# Patient Record
Sex: MALE | Race: BLACK OR AFRICAN AMERICAN
[De-identification: names, ages, dates, MRNs, and addresses within clinical notes are randomized per-mention and may not be internally consistent; named-entity substitution may affect disease eponyms.]

---

## 2018-11-16 ENCOUNTER — HOSPITAL ENCOUNTER (INPATIENT)
Dept: HOSPITAL 72 - EMR | Age: 78
LOS: 5 days | Discharge: NURSING, REHAB OR LONG-TERM CARE | DRG: 91 | End: 2018-11-21
Payer: MEDICARE

## 2018-11-16 VITALS — DIASTOLIC BLOOD PRESSURE: 91 MMHG | SYSTOLIC BLOOD PRESSURE: 112 MMHG

## 2018-11-16 VITALS — SYSTOLIC BLOOD PRESSURE: 106 MMHG | DIASTOLIC BLOOD PRESSURE: 85 MMHG

## 2018-11-16 VITALS — DIASTOLIC BLOOD PRESSURE: 67 MMHG | SYSTOLIC BLOOD PRESSURE: 96 MMHG

## 2018-11-16 VITALS — SYSTOLIC BLOOD PRESSURE: 113 MMHG | DIASTOLIC BLOOD PRESSURE: 54 MMHG

## 2018-11-16 VITALS — BODY MASS INDEX: 24.96 KG/M2 | HEIGHT: 67 IN | WEIGHT: 159.05 LBS

## 2018-11-16 DIAGNOSIS — Z66: ICD-10-CM

## 2018-11-16 DIAGNOSIS — I27.20: ICD-10-CM

## 2018-11-16 DIAGNOSIS — N17.9: ICD-10-CM

## 2018-11-16 DIAGNOSIS — I69.391: ICD-10-CM

## 2018-11-16 DIAGNOSIS — G20: ICD-10-CM

## 2018-11-16 DIAGNOSIS — G92: Primary | ICD-10-CM

## 2018-11-16 DIAGNOSIS — I36.1: ICD-10-CM

## 2018-11-16 DIAGNOSIS — L89.154: ICD-10-CM

## 2018-11-16 DIAGNOSIS — F41.9: ICD-10-CM

## 2018-11-16 DIAGNOSIS — R62.7: ICD-10-CM

## 2018-11-16 DIAGNOSIS — Z51.5: ICD-10-CM

## 2018-11-16 DIAGNOSIS — D69.6: ICD-10-CM

## 2018-11-16 DIAGNOSIS — R33.9: ICD-10-CM

## 2018-11-16 DIAGNOSIS — E43: ICD-10-CM

## 2018-11-16 DIAGNOSIS — E86.0: ICD-10-CM

## 2018-11-16 DIAGNOSIS — E87.0: ICD-10-CM

## 2018-11-16 DIAGNOSIS — I34.0: ICD-10-CM

## 2018-11-16 DIAGNOSIS — D63.8: ICD-10-CM

## 2018-11-16 DIAGNOSIS — R13.10: ICD-10-CM

## 2018-11-16 DIAGNOSIS — I95.9: ICD-10-CM

## 2018-11-16 DIAGNOSIS — G82.50: ICD-10-CM

## 2018-11-16 LAB
ADD MANUAL DIFF: YES
ALBUMIN SERPL-MCNC: 3 G/DL (ref 3.4–5)
ALBUMIN/GLOB SERPL: 0.7 {RATIO} (ref 1–2.7)
ALP SERPL-CCNC: 45 U/L (ref 46–116)
ALT SERPL-CCNC: 78 U/L (ref 12–78)
ANION GAP SERPL CALC-SCNC: 12 MMOL/L (ref 5–15)
APTT BLD: 30 SEC (ref 23–33)
AST SERPL-CCNC: 55 U/L (ref 15–37)
BILIRUB SERPL-MCNC: 0.2 MG/DL (ref 0.2–1)
BUN SERPL-MCNC: 44 MG/DL (ref 7–18)
CALCIUM SERPL-MCNC: 9.3 MG/DL (ref 8.5–10.1)
CHLORIDE SERPL-SCNC: 114 MMOL/L (ref 98–107)
CK MB SERPL-MCNC: 11.4 NG/ML (ref 0–3.6)
CK SERPL-CCNC: 95 U/L (ref 26–308)
CO2 SERPL-SCNC: 28 MMOL/L (ref 21–32)
CREAT SERPL-MCNC: 1.4 MG/DL (ref 0.55–1.3)
ERYTHROCYTE [DISTWIDTH] IN BLOOD BY AUTOMATED COUNT: 12.4 % (ref 11.6–14.8)
GLOBULIN SER-MCNC: 4.2 G/DL
HCT VFR BLD CALC: 38.1 % (ref 42–52)
HGB BLD-MCNC: 13.1 G/DL (ref 14.2–18)
INR PPP: 1.1 (ref 0.9–1.1)
MCV RBC AUTO: 84 FL (ref 80–99)
PHOSPHATE SERPL-MCNC: 4 MG/DL (ref 2.5–4.9)
PLATELET # BLD: 102 K/UL (ref 150–450)
POTASSIUM SERPL-SCNC: 3.5 MMOL/L (ref 3.5–5.1)
RBC # BLD AUTO: 4.53 M/UL (ref 4.7–6.1)
SODIUM SERPL-SCNC: 153 MMOL/L (ref 136–145)
WBC # BLD AUTO: 7.6 K/UL (ref 4.8–10.8)

## 2018-11-16 PROCEDURE — 83615 LACTATE (LD) (LDH) ENZYME: CPT

## 2018-11-16 PROCEDURE — 87086 URINE CULTURE/COLONY COUNT: CPT

## 2018-11-16 PROCEDURE — 80061 LIPID PANEL: CPT

## 2018-11-16 PROCEDURE — 93306 TTE W/DOPPLER COMPLETE: CPT

## 2018-11-16 PROCEDURE — 83540 ASSAY OF IRON: CPT

## 2018-11-16 PROCEDURE — 82553 CREATINE MB FRACTION: CPT

## 2018-11-16 PROCEDURE — 80053 COMPREHEN METABOLIC PANEL: CPT

## 2018-11-16 PROCEDURE — 70551 MRI BRAIN STEM W/O DYE: CPT

## 2018-11-16 PROCEDURE — 86709 HEPATITIS A IGM ANTIBODY: CPT

## 2018-11-16 PROCEDURE — 85730 THROMBOPLASTIN TIME PARTIAL: CPT

## 2018-11-16 PROCEDURE — 87340 HEPATITIS B SURFACE AG IA: CPT

## 2018-11-16 PROCEDURE — 74018 RADEX ABDOMEN 1 VIEW: CPT

## 2018-11-16 PROCEDURE — 86803 HEPATITIS C AB TEST: CPT

## 2018-11-16 PROCEDURE — 82746 ASSAY OF FOLIC ACID SERUM: CPT

## 2018-11-16 PROCEDURE — 76700 US EXAM ABDOM COMPLETE: CPT

## 2018-11-16 PROCEDURE — 70450 CT HEAD/BRAIN W/O DYE: CPT

## 2018-11-16 PROCEDURE — 84443 ASSAY THYROID STIM HORMONE: CPT

## 2018-11-16 PROCEDURE — 86705 HEP B CORE ANTIBODY IGM: CPT

## 2018-11-16 PROCEDURE — 85025 COMPLETE CBC W/AUTO DIFF WBC: CPT

## 2018-11-16 PROCEDURE — 86140 C-REACTIVE PROTEIN: CPT

## 2018-11-16 PROCEDURE — 82728 ASSAY OF FERRITIN: CPT

## 2018-11-16 PROCEDURE — 84484 ASSAY OF TROPONIN QUANT: CPT

## 2018-11-16 PROCEDURE — 71045 X-RAY EXAM CHEST 1 VIEW: CPT

## 2018-11-16 PROCEDURE — 84100 ASSAY OF PHOSPHORUS: CPT

## 2018-11-16 PROCEDURE — 82607 VITAMIN B-12: CPT

## 2018-11-16 PROCEDURE — 96360 HYDRATION IV INFUSION INIT: CPT

## 2018-11-16 PROCEDURE — 81001 URINALYSIS AUTO W/SCOPE: CPT

## 2018-11-16 PROCEDURE — 84550 ASSAY OF BLOOD/URIC ACID: CPT

## 2018-11-16 PROCEDURE — 82550 ASSAY OF CK (CPK): CPT

## 2018-11-16 PROCEDURE — 93005 ELECTROCARDIOGRAM TRACING: CPT

## 2018-11-16 PROCEDURE — 87040 BLOOD CULTURE FOR BACTERIA: CPT

## 2018-11-16 PROCEDURE — 85610 PROTHROMBIN TIME: CPT

## 2018-11-16 PROCEDURE — 86703 HIV-1/HIV-2 1 RESULT ANTBDY: CPT

## 2018-11-16 PROCEDURE — 85007 BL SMEAR W/DIFF WBC COUNT: CPT

## 2018-11-16 PROCEDURE — 36415 COLL VENOUS BLD VENIPUNCTURE: CPT

## 2018-11-16 PROCEDURE — 82977 ASSAY OF GGT: CPT

## 2018-11-16 PROCEDURE — 83880 ASSAY OF NATRIURETIC PEPTIDE: CPT

## 2018-11-16 PROCEDURE — 83735 ASSAY OF MAGNESIUM: CPT

## 2018-11-16 PROCEDURE — 80048 BASIC METABOLIC PNL TOTAL CA: CPT

## 2018-11-16 PROCEDURE — 83010 ASSAY OF HAPTOGLOBIN QUANT: CPT

## 2018-11-16 PROCEDURE — 99285 EMERGENCY DEPT VISIT HI MDM: CPT

## 2018-11-16 PROCEDURE — 93970 EXTREMITY STUDY: CPT

## 2018-11-16 PROCEDURE — 83036 HEMOGLOBIN GLYCOSYLATED A1C: CPT

## 2018-11-16 PROCEDURE — 82962 GLUCOSE BLOOD TEST: CPT

## 2018-11-16 PROCEDURE — 96361 HYDRATE IV INFUSION ADD-ON: CPT

## 2018-11-16 PROCEDURE — 83605 ASSAY OF LACTIC ACID: CPT

## 2018-11-16 PROCEDURE — 83550 IRON BINDING TEST: CPT

## 2018-11-16 NOTE — DIAGNOSTIC IMAGING REPORT
Indications: Altered mental status

 

Technique: Spiral acquisitions obtained through the brain. Angled axial and coronal 5

x 5 mm slices were reconstructed. Total dose length product 1491.99 mGycm.  CTDI

vol(s) 70.38 mGy. Dose reduction achieved using automated exposure control

 

Comparison: None.

 

Findings: There is marked age-related enlargement of the ventricles and extra axial

CSF spaces, and considerable periventricular deep white matter low-attenuation

consistent with chronic ischemic change. There is an age-indeterminate lacunar

infarct in the left external capsule region. No acute intracranial hemorrhage nor

edema, mass effect, nor midline shift. Otherwise normal gray-white differentiation.

Visualized orbits and sinuses are unremarkable. The calvarium is intact. There is an

ununited multipartite fracture deformity of the left zygomatic arch.

 

Impression: Chronic and age-related changes.

 

Age indeterminate left external capsule region lacunar infarct, suspect old. MRI may

be useful to clarify if clinically indicated

 

Negative for acute intracranial bleed or mass effect

 

Multipartite fracture deformity of the left zygomatic arch. Age indeterminate

although suspect old. Correlate with clinical history

 

 

 

The CT scanner at [facility] is accredited by the [employer] College of

[employer]iology and the scans are performed using protocols designed to limit radiation

exposure to as low as reasonably achievable to attain images of sufficient resolution

adequate for diagnostic evaluation.

## 2018-11-16 NOTE — DIAGNOSTIC IMAGING REPORT
Indication: Cough

 

Technique: One view of the chest

 

Comparison: none

 

Findings: Lungs are somewhat hyperinflated. Lungs and pleural spaces are clear. There

are old healed left rib fractures. The aorta is somewhat tortuous.

 

Impression: No acute process. Findings as noted

## 2018-11-16 NOTE — EMERGENCY ROOM REPORT
History of Present Illness


General


Chief Complaint:  Altered Level of Consciousness


Source:  Family Member





Present Illness


Allergies:  


Coded Allergies:  


     No Known Allergies (Unverified , 11/16/18)





Physical Exam





Vital Signs








  Date Time  Temp Pulse Resp B/P (MAP) Pulse Ox O2 Delivery O2 Flow Rate FiO2


 


11/16/18 11:34  66 12   Room Air  


 


11/16/18 13:14    106/85 100   











Medical Decision Making


Diagnostic Impression:  


 Primary Impression:  


 Altered mental status


 Additional Impressions:  


 CVA (cerebral vascular accident)


 Hypernatremia


 Dehydration


 Dysphagia





Laboratory Tests








Test


  11/16/18


12:20 11/16/18


12:23 11/16/18


14:30


 


White Blood Count


  7.6 K/UL


(4.8-10.8) 


  


 


 


Red Blood Count


  4.53 M/UL


(4.70-6.10)  L 


  


 


 


Hemoglobin


  13.1 G/DL


(14.2-18.0)  L 


  


 


 


Hematocrit


  38.1 %


(42.0-52.0)  L 


  


 


 


Mean Corpuscular Volume 84 FL (80-99)    


 


Mean Corpuscular Hemoglobin


  28.9 PG


(27.0-31.0) 


  


 


 


Mean Corpuscular Hemoglobin


Concent 34.4 G/DL


(32.0-36.0) 


  


 


 


Red Cell Distribution Width


  12.4 %


(11.6-14.8) 


  


 


 


Platelet Count


  102 K/UL


(150-450)  L 


  


 


 


Mean Platelet Volume


  9.2 FL


(6.5-10.1) 


  


 


 


Neutrophils (%) (Auto)


  % (45.0-75.0)


  


  


 


 


Lymphocytes (%) (Auto)


  % (20.0-45.0)


  


  


 


 


Monocytes (%) (Auto)  % (1.0-10.0)    


 


Eosinophils (%) (Auto)  % (0.0-3.0)    


 


Basophils (%) (Auto)  % (0.0-2.0)    


 


Differential Total Cells


Counted 100  


  


  


 


 


Neutrophils % (Manual) 92 % (45-75)  H  


 


Lymphocytes % (Manual) 3 % (20-45)  L  


 


Monocytes % (Manual) 5 % (1-10)    


 


Eosinophils % (Manual) 0 % (0-3)    


 


Basophils % (Manual) 0 % (0-2)    


 


Band Neutrophils 0 % (0-8)    


 


Platelet Estimate Decreased  L  


 


Platelet Morphology Normal    


 


Red Blood Cell Morphology Normal    


 


Prothrombin Time


  11.7 SEC


(9.30-11.50)  H 


  


 


 


Prothrombin Time INR 1.1 (0.9-1.1)    


 


PTT


  30 SEC (23-33)


  


  


 


 


Sodium Level


  153 MMOL/L


(136-145)  H 


  


 


 


Potassium Level


  3.5 MMOL/L


(3.5-5.1) 


  


 


 


Chloride Level


  114 MMOL/L


()  H 


  


 


 


Carbon Dioxide Level


  28 MMOL/L


(21-32) 


  


 


 


Anion Gap


  12 mmol/L


(5-15) 


  


 


 


Blood Urea Nitrogen


  44 mg/dL


(7-18)  H 


  


 


 


Creatinine


  1.4 MG/DL


(0.55-1.30)  H 


  


 


 


Estimate Glomerular


Filtration Rate  mL/min (>60)  


  


  


 


 


Glucose Level


  89 MG/DL


() 


  


 


 


Calcium Level


  9.3 MG/DL


(8.5-10.1) 


  


 


 


Phosphorus Level


  4.0 MG/DL


(2.5-4.9) 


  


 


 


Magnesium Level


  2.5 MG/DL


(1.8-2.4)  H 


  


 


 


Total Bilirubin


  0.2 MG/DL


(0.2-1.0) 


  


 


 


Aspartate Amino Transferase


(AST) 55 U/L (15-37)


H 


  


 


 


Alanine Aminotransferase (ALT)


  78 U/L (12-78)


  


  


 


 


Alkaline Phosphatase


  45 U/L


()  L 


  


 


 


Total Creatine Kinase


  95 U/L


() 


  


 


 


Creatine Kinase MB


  11.4 NG/ML


(0.0-3.6)  H 


  


 


 


Creatine Kinase MB Relative


Index 12.0  


  


  


 


 


Troponin I


  0.006 ng/mL


(0.000-0.056) 


  


 


 


Total Protein


  7.2 G/DL


(6.4-8.2) 


  


 


 


Albumin


  3.0 G/DL


(3.4-5.0)  L 


  


 


 


Globulin 4.2 g/dL    


 


Albumin/Globulin Ratio


  0.7 (1.0-2.7)


L 


  


 


 


Lactic Acid Level


  


  3.90 mmol/L


(0.4-2.0)  H 4.10 mmol/L


(0.66-2.22)  H








EKG Diagnostic Results


Rate:  normal


Rhythm:  NSR


ST Segments:  no acute changes





Last Vital Signs








  Date Time  Temp Pulse Resp B/P (MAP) Pulse Ox O2 Delivery O2 Flow Rate FiO2


 


11/16/18 13:17  58 12   Room Air  


 


11/16/18 13:14    106/85 100   








Referrals:  


NON PHYSICIAN (PCP)











Rose Mary Taylor DO Nov 16, 2018 16:20

## 2018-11-17 VITALS — SYSTOLIC BLOOD PRESSURE: 113 MMHG | DIASTOLIC BLOOD PRESSURE: 70 MMHG

## 2018-11-17 VITALS — SYSTOLIC BLOOD PRESSURE: 110 MMHG | DIASTOLIC BLOOD PRESSURE: 62 MMHG

## 2018-11-17 VITALS — DIASTOLIC BLOOD PRESSURE: 56 MMHG | SYSTOLIC BLOOD PRESSURE: 95 MMHG

## 2018-11-17 VITALS — DIASTOLIC BLOOD PRESSURE: 83 MMHG | SYSTOLIC BLOOD PRESSURE: 136 MMHG

## 2018-11-17 VITALS — DIASTOLIC BLOOD PRESSURE: 55 MMHG | SYSTOLIC BLOOD PRESSURE: 93 MMHG

## 2018-11-17 VITALS — DIASTOLIC BLOOD PRESSURE: 67 MMHG | SYSTOLIC BLOOD PRESSURE: 130 MMHG

## 2018-11-17 LAB
ADD MANUAL DIFF: YES
ANION GAP SERPL CALC-SCNC: 12 MMOL/L (ref 5–15)
APPEARANCE UR: (no result)
APTT PPP: (no result) S
BUN SERPL-MCNC: 39 MG/DL (ref 7–18)
CALCIUM SERPL-MCNC: 8.5 MG/DL (ref 8.5–10.1)
CHLORIDE SERPL-SCNC: 120 MMOL/L (ref 98–107)
CO2 SERPL-SCNC: 25 MMOL/L (ref 21–32)
CREAT SERPL-MCNC: 1.3 MG/DL (ref 0.55–1.3)
ERYTHROCYTE [DISTWIDTH] IN BLOOD BY AUTOMATED COUNT: 12.2 % (ref 11.6–14.8)
GLUCOSE UR STRIP-MCNC: NEGATIVE MG/DL
HCT VFR BLD CALC: 34.3 % (ref 42–52)
HGB BLD-MCNC: 11.6 G/DL (ref 14.2–18)
KETONES UR QL STRIP: (no result)
LEUKOCYTE ESTERASE UR QL STRIP: (no result)
MCV RBC AUTO: 84 FL (ref 80–99)
NITRITE UR QL STRIP: NEGATIVE
PH UR STRIP: 5 [PH] (ref 4.5–8)
PLATELET # BLD: 79 K/UL (ref 150–450)
POTASSIUM SERPL-SCNC: 3.7 MMOL/L (ref 3.5–5.1)
PROT UR QL STRIP: (no result)
RBC # BLD AUTO: 4.09 M/UL (ref 4.7–6.1)
SODIUM SERPL-SCNC: 157 MMOL/L (ref 136–145)
SP GR UR STRIP: 1.02 (ref 1–1.03)
UROBILINOGEN UR-MCNC: 1 MG/DL (ref 0–1)
WBC # BLD AUTO: 6.4 K/UL (ref 4.8–10.8)

## 2018-11-17 RX ADMIN — PANTOPRAZOLE SODIUM SCH MG: 40 INJECTION, POWDER, FOR SOLUTION INTRAVENOUS at 13:58

## 2018-11-17 RX ADMIN — ASPIRIN 81 MG SCH MG: 81 TABLET ORAL at 08:08

## 2018-11-17 NOTE — CONSULTATION
History of Present Illness


General


Date patient seen:  Nov 17, 2018


Chief Complaint:  Altered Level of Consciousness


Reason for Consultation:  stage 4 sacral ulcer





Present Illness


HPI


77 year old male with multiple medical comorbidities currently admitted for 

medical care and management.  upon admission noted to have a sacral decubitus 

ulcer.  given size, location, and care required, surgery called to evaluate and 

assist with management.  Patient seen, chart reviewed, patient examined.  

currently awake and alert but not very responsive.


Allergies:  


Coded Allergies:  


     No Known Allergies (Unverified , 11/16/18)





Medication History


Scheduled


Lorazepam (Lorazepam), 2 MG ORAL THREE TIMES A DAY, (Reported)





Patient History


Limited by:  medical condition


History Provided By:  Medical Record, PMD


Healthcare decision maker





Resuscitation status


Full Code


Advanced Directive on File


No





Past Medical/Surgical History


Past Medical/Surgical History:  


(1) Dehydration


(2) Dysphagia


(3) Hypernatremia


(4) Altered mental status


(5) CVA (cerebral vascular accident)





Review of Systems


All Other Systems:  negative except mentioned in HPI





Physical Exam


General Appearance:  no apparent distress


Lines, tubes and drains:  peripheral


HEENT:  mucous membranes moist


Neck:  normal inspection


Respiratory/Chest:  normal breath sounds, no respiratory distress


Cardiovascular/Chest:  normal rate


Abdomen:  soft, no organomegaly, no mass


Extremities:  non-tender, other


Skin Exam:  other


Neurologic:  alert





Last 24 Hour Vital Signs








  Date Time  Temp Pulse Resp B/P (MAP) Pulse Ox O2 Delivery O2 Flow Rate FiO2


 


11/17/18 07:56  66      


 


11/17/18 07:56 97.2 65 20 93/55 (68) 98   


 


11/17/18 07:25      Room Air  


 


11/17/18 04:00  57      


 


11/17/18 04:00 97.2 60 20 110/62 (78) 98   


 


11/17/18 00:00  65      


 


11/17/18 00:00 97.0 97 20 130/67 (88) 96   


 


11/16/18 21:29      Room Air  


 


11/16/18 21:26      Room Air  


 


11/16/18 20:00 97.1 60 20 96/67 (77) 99   


 


11/16/18 20:00  56      


 


11/16/18 18:25 96.3 58 21 113/54 (73) 99   


 


11/16/18 17:11  57 19 112/91 100 Room Air  


 


11/16/18 13:17  58 12   Room Air  


 


11/16/18 13:14  58 12 106/85 100 Room Air  


 


11/16/18 11:34  66 12   Room Air  

















Intake and Output  


 


 11/16/18 11/17/18





 19:00 07:00


 


Intake Total 1000 ml 628 ml


 


Balance 1000 ml 628 ml


 


  


 


Intake IV Total 1000 ml 628 ml


 


Other 0 ml 


 


# Voids  1


 


# Bowel Movements  1











Laboratory Tests








Test


  11/16/18


12:20 11/16/18


12:23 11/16/18


14:30 11/17/18


06:48


 


White Blood Count


  7.6 K/UL


(4.8-10.8) 


  


  6.4 K/UL


(4.8-10.8)


 


Red Blood Count


  4.53 M/UL


(4.70-6.10)  L 


  


  4.09 M/UL


(4.70-6.10)  L


 


Hemoglobin


  13.1 G/DL


(14.2-18.0)  L 


  


  11.6 G/DL


(14.2-18.0)  L


 


Hematocrit


  38.1 %


(42.0-52.0)  L 


  


  34.3 %


(42.0-52.0)  L


 


Mean Corpuscular Volume 84 FL (80-99)     84 FL (80-99)  


 


Mean Corpuscular Hemoglobin


  28.9 PG


(27.0-31.0) 


  


  28.3 PG


(27.0-31.0)


 


Mean Corpuscular Hemoglobin


Concent 34.4 G/DL


(32.0-36.0) 


  


  33.7 G/DL


(32.0-36.0)


 


Red Cell Distribution Width


  12.4 %


(11.6-14.8) 


  


  12.2 %


(11.6-14.8)


 


Platelet Count


  102 K/UL


(150-450)  L 


  


  79 K/UL


(150-450)  L


 


Mean Platelet Volume


  9.2 FL


(6.5-10.1) 


  


  10.2 FL


(6.5-10.1)  H


 


Neutrophils (%) (Auto)


  % (45.0-75.0)


  


  


  % (45.0-75.0)


 


 


Lymphocytes (%) (Auto)


  % (20.0-45.0)


  


  


  % (20.0-45.0)


 


 


Monocytes (%) (Auto)  % (1.0-10.0)      % (1.0-10.0)  


 


Eosinophils (%) (Auto)  % (0.0-3.0)      % (0.0-3.0)  


 


Basophils (%) (Auto)  % (0.0-2.0)      % (0.0-2.0)  


 


Differential Total Cells


Counted 100  


  


  


  


 


 


Neutrophils % (Manual) 92 % (45-75)  H   Pending  


 


Lymphocytes % (Manual) 3 % (20-45)  L   Pending  


 


Monocytes % (Manual) 5 % (1-10)     


 


Eosinophils % (Manual) 0 % (0-3)     


 


Basophils % (Manual) 0 % (0-2)     


 


Band Neutrophils 0 % (0-8)     


 


Platelet Estimate Decreased  L   Pending  


 


Platelet Morphology Normal     Pending  


 


Red Blood Cell Morphology Normal     


 


Prothrombin Time


  11.7 SEC


(9.30-11.50)  H 


  


  


 


 


Prothromb Time International


Ratio 1.1 (0.9-1.1)  


  


  


  


 


 


Activated Partial


Thromboplast Time 30 SEC (23-33)


  


  


  


 


 


Sodium Level


  153 MMOL/L


(136-145)  H 


  


  157 MMOL/L


(136-145)  H


 


Potassium Level


  3.5 MMOL/L


(3.5-5.1) 


  


  3.7 MMOL/L


(3.5-5.1)


 


Chloride Level


  114 MMOL/L


()  H 


  


  120 MMOL/L


()  H


 


Carbon Dioxide Level


  28 MMOL/L


(21-32) 


  


  25 MMOL/L


(21-32)


 


Anion Gap


  12 mmol/L


(5-15) 


  


  12 mmol/L


(5-15)


 


Blood Urea Nitrogen


  44 mg/dL


(7-18)  H 


  


  39 mg/dL


(7-18)  H


 


Creatinine


  1.4 MG/DL


(0.55-1.30)  H 


  


  1.3 MG/DL


(0.55-1.30)


 


Estimat Glomerular Filtration


Rate  mL/min (>60)  


  


  


   mL/min (>60)  


 


 


Glucose Level


  89 MG/DL


() 


  


  60 MG/DL


()  L


 


Calcium Level


  9.3 MG/DL


(8.5-10.1) 


  


  8.5 MG/DL


(8.5-10.1)


 


Phosphorus Level


  4.0 MG/DL


(2.5-4.9) 


  


  


 


 


Magnesium Level


  2.5 MG/DL


(1.8-2.4)  H 


  


  


 


 


Total Bilirubin


  0.2 MG/DL


(0.2-1.0) 


  


  


 


 


Aspartate Amino Transf


(AST/SGOT) 55 U/L (15-37)


H 


  


  


 


 


Alanine Aminotransferase


(ALT/SGPT) 78 U/L (12-78)


  


  


  


 


 


Alkaline Phosphatase


  45 U/L


()  L 


  


  


 


 


Total Creatine Kinase


  95 U/L


() 


  


  


 


 


Creatine Kinase MB


  11.4 NG/ML


(0.0-3.6)  H 


  


  


 


 


Creatine Kinase MB Relative


Index 12.0  


  


  


  


 


 


Troponin I


  0.006 ng/mL


(0.000-0.056) 


  


  


 


 


Total Protein


  7.2 G/DL


(6.4-8.2) 


  


  


 


 


Albumin


  3.0 G/DL


(3.4-5.0)  L 


  


  


 


 


Globulin 4.2 g/dL     


 


Albumin/Globulin Ratio


  0.7 (1.0-2.7)


L 


  


  


 


 


Lactic Acid Level


  


  3.90 mmol/L


(0.4-2.0)  H 4.10 mmol/L


(0.66-2.22)  H 


 








Height (Feet):  5


Height (Inches):  7.00


Weight (Pounds):  100


Medications





Current Medications








 Medications


  (Trade)  Dose


 Ordered  Sig/Jefferson


 Route


 PRN Reason  Start Time


 Stop Time Status Last Admin


Dose Admin


 


 Acetaminophen


  (Tylenol)  650 mg  Q4H  PRN


 ORAL


 Mild Pain (Pain Scale 1-3)  11/16/18 21:00


 12/16/18 20:59   


 


 


 Aspirin


  (ASA)  81 mg  DAILY


 ORAL


   11/17/18 09:00


 12/17/18 08:59  11/17/18 08:08


 


 


 Dextrose


  (Dextrose 50%)  25 ml  Q30M  PRN


 IV


 Hypoglycemia  11/16/18 21:00


 12/16/18 20:59   


 


 


 Dextrose


  (Dextrose 50%)  50 ml  Q30M  PRN


 IV


 Hypoglycemia  11/16/18 21:00


 12/16/18 20:59   


 


 


 Gadobutrol


  (Gadavist)  7.5 mmol  NOW  PRN


 IV


 Radiology Procedure  11/16/18 21:00


 11/19/18 20:52   


 


 


 Sodium Chloride  1,000 ml @ 


 75 mls/hr  [id number]


 IV


   11/16/18 21:30


 12/16/18 21:29  11/16/18 22:37


 











Assessment/Plan


Problem List:  


(1) Decubitus ulcer of sacral region, stage 4


Assessment & Plan:  77 year old male presented with Resolving stage 4 full 

thickness sacral decubitus ulcer.  Seems to have had prior debridement and 

care.  Seems to be healing well over time.  Chronic.  no odor.  no significant 

drainage.  no signs of infection.  wound bed clean with granulation tissue.  

3x4.  karen-wound clean.  





Plan:


Wash sacral decubitus ulcer daily with NS, apply hydrogel, pack with gauze, 

apply foam dressing daily and prn





turn q2h





air mattress





heel protectors


ICD Codes:  L89.154 - Pressure ulcer of sacral region, stage 4


SNOMED:  591646851, [id number]











Adalberto Cox Nov 17, 2018 10:45

## 2018-11-17 NOTE — CONSULTATION
Consult Note


Consult Note


asked to eval for renal failure-





 77 year old male presented with Resolving stage 4 full thickness sacral 

decubitus ulcer.  Seems to have had prior debridement and care.  Seems to be 

healing well over time.  Chronic.  no odor.  no significant drainage.  no signs 

of infection.  wound bed clean with granulation tissue.  3x4.  karen-wound 

clean.  


patient examined-


data reviewed


bladder ADEBAYO 375 cc retention





.


Assessment/Plan





 Altered mental status


 CVA (cerebral vascular accident)


 Hypernatremia


 Dehydration / Urinary retention


 Dysphagia


 Decubitus ulcer of sacral region, stage 4


 


 


 Plan:


 


 D5W


 Monitor renal parameters


 Skin care


 Chris Mitchell MD Nov 17, 2018 13:22

## 2018-11-17 NOTE — CONSULTATION
History of Present Illness


General


Chief Complaint:  Altered Level of Consciousness


Reason for Consultation:  stage 4 sacral ulcer





Present Illness


HPI


77-year-old male, who bi to


emergency room for having unable to talk, having dysphagia, hypernatremia,


and altered mental status. the pt has hx of cognitive impairment and anxiety. 

the pt has waxing and waning of consciousness. poor memory


Allergies:  


Coded Allergies:  


     No Known Allergies (Unverified , 11/16/18)





Medication History


Scheduled


Lorazepam (Lorazepam), 2 MG ORAL THREE TIMES A DAY, (Reported)





Patient History


Limited by:  medical condition


History Provided By:  Patient, Medical Record


Healthcare decision maker





Resuscitation status


Full Code


Advanced Directive on File


No





Past Medical/Surgical History


Past Medical/Surgical History:  


(1) Dehydration


(2) Dysphagia


(3) Hypernatremia


(4) Altered mental status


(5) CVA (cerebral vascular accident)


(6) Decubitus ulcer of sacral region, stage 4





Review of Systems


Psychiatric:  Reports: prior hx, anxiety, depressed feelings, emotional problems





Physical Exam


General Appearance:  confused, agitated





Last 24 Hour Vital Signs








  Date Time  Temp Pulse Resp B/P (MAP) Pulse Ox O2 Delivery O2 Flow Rate FiO2


 


11/17/18 21:00      Room Air  


 


11/17/18 20:00 97.0 67 20 95/56 (69) 98   


 


11/17/18 20:00  66      


 


11/17/18 15:34  73      


 


11/17/18 15:22 97.2 63 20 113/70 (84) 98   





        


 


11/17/18 13:02 97.2 67 20 136/83 (100) 98   


 


11/17/18 11:49  64      


 


11/17/18 07:56  66      


 


11/17/18 07:56 97.2 65 20 93/55 (68) 98   


 


11/17/18 07:25      Room Air  


 


11/17/18 04:00  57      


 


11/17/18 04:00 97.2 60 20 110/62 (78) 98   


 


11/17/18 00:00  65      


 


11/17/18 00:00 97.0 97 20 130/67 (88) 96   

















Intake and Output  


 


 11/16/18 11/17/18





 19:00 07:00


 


Intake Total 1000 ml 628 ml


 


Balance 1000 ml 628 ml


 


  


 


Intake IV Total 1000 ml 628 ml


 


Other 0 ml 


 


# Voids  1


 


# Bowel Movements  1











Laboratory Tests








Test


  11/17/18


06:45 11/17/18


06:48 11/17/18


17:30


 


Haptoglobin Pending    


 


Lactate Dehydrogenase


  260 U/L


()  H 


  


 


 


Hepatitis A IgM Antibody Pending    


 


Hepatitis B Surface Antigen Pending    


 


Hepatitis B Core IgM Antibody Pending    


 


Hepatitis C Antibody Pending    


 


White Blood Count


  


  6.4 K/UL


(4.8-10.8) 


 


 


Red Blood Count


  


  4.09 M/UL


(4.70-6.10)  L 


 


 


Hemoglobin


  


  11.6 G/DL


(14.2-18.0)  L 


 


 


Hematocrit


  


  34.3 %


(42.0-52.0)  L 


 


 


Mean Corpuscular Volume  84 FL (80-99)   


 


Mean Corpuscular Hemoglobin


  


  28.3 PG


(27.0-31.0) 


 


 


Mean Corpuscular Hemoglobin


Concent 


  33.7 G/DL


(32.0-36.0) 


 


 


Red Cell Distribution Width


  


  12.2 %


(11.6-14.8) 


 


 


Platelet Count


  


  79 K/UL


(150-450)  L 


 


 


Mean Platelet Volume


  


  10.2 FL


(6.5-10.1)  H 


 


 


Neutrophils (%) (Auto)


  


  % (45.0-75.0)


  


 


 


Lymphocytes (%) (Auto)


  


  % (20.0-45.0)


  


 


 


Monocytes (%) (Auto)   % (1.0-10.0)   


 


Eosinophils (%) (Auto)   % (0.0-3.0)   


 


Basophils (%) (Auto)   % (0.0-2.0)   


 


Differential Total Cells


Counted 


  100  


  


 


 


Neutrophils % (Manual)  76 % (45-75)  H 


 


Lymphocytes % (Manual)  1 % (20-45)  L 


 


Monocytes % (Manual)  6 % (1-10)   


 


Eosinophils % (Manual)  0 % (0-3)   


 


Basophils % (Manual)  0 % (0-2)   


 


Band Neutrophils  17 % (0-8)  H 


 


Platelet Estimate  Decreased  L 


 


Platelet Morphology  Normal   


 


Red Blood Cell Morphology  Normal   


 


Sodium Level


  


  157 MMOL/L


(136-145)  H 


 


 


Potassium Level


  


  3.7 MMOL/L


(3.5-5.1) 


 


 


Chloride Level


  


  120 MMOL/L


()  H 


 


 


Carbon Dioxide Level


  


  25 MMOL/L


(21-32) 


 


 


Anion Gap


  


  12 mmol/L


(5-15) 


 


 


Blood Urea Nitrogen


  


  39 mg/dL


(7-18)  H 


 


 


Creatinine


  


  1.3 MG/DL


(0.55-1.30) 


 


 


Estimat Glomerular Filtration


Rate 


   mL/min (>60)  


  


 


 


Glucose Level


  


  60 MG/DL


()  L 


 


 


Calcium Level


  


  8.5 MG/DL


(8.5-10.1) 


 


 


C-Reactive Protein,


Quantitative 


  3.7 mg/dL


(0.00-0.90)  H 


 


 


HIV (1&2) Antibody Rapid


  


  Negative


(NEGATIVE) 


 


 


Urine Color   Brown  


 


Urine Appearance


  


  


  Slightly


cloudy


 


Urine pH   5 (4.5-8.0)  


 


Urine Specific Gravity


  


  


  1.020


(1.005-1.035)


 


Urine Protein


  


  


  3+ (NEGATIVE)


H


 


Urine Glucose (UA)


  


  


  Negative


(NEGATIVE)


 


Urine Ketones


  


  


  1+ (NEGATIVE)


H


 


Urine Blood


  


  


  5+ (NEGATIVE)


H


 


Urine Nitrite


  


  


  Negative


(NEGATIVE)


 


Urine Bilirubin


  


  


  Negative


(NEGATIVE)


 


Urine Urobilinogen


  


  


  1 MG/DL


(0.0-1.0)  H


 


Urine Leukocyte Esterase


  


  


  2+ (NEGATIVE)


H


 


Urine RBC


  


  


  15-20 /HPF (0


- 0)  H


 


Urine WBC


  


  


  5-10 /HPF (0 -


0)  H


 


Urine Squamous Epithelial


Cells 


  


  Few /LPF


(NONE/OCC)


 


Urine Amorphous Sediment


  


  


  Few /LPF


(NONE)  H


 


Urine Bacteria


  


  


  Moderate /HPF


(NONE)  H








Height (Feet):  5


Height (Inches):  7.00


Weight (Pounds):  100


Medications





Current Medications








 Medications


  (Trade)  Dose


 Ordered  Sig/Jefferson


 Route


 PRN Reason  Start Time


 Stop Time Status Last Admin


Dose Admin


 


 Acetaminophen


  (Tylenol)  650 mg  Q4H  PRN


 ORAL


 Mild Pain (Pain Scale 1-3)  11/16/18 21:00


 12/16/18 20:59   


 


 


 Aspirin


  (ASA)  81 mg  DAILY


 ORAL


   11/17/18 09:00


 12/17/18 08:59  11/17/18 08:08


 


 


 Dextrose  1,000 ml @ 


 100 mls/hr  Q10H


 IV


   11/17/18 13:30


 12/17/18 13:29  11/17/18 22:01


 


 


 Dextrose


  (Dextrose 50%)  25 ml  Q30M  PRN


 IV


 Hypoglycemia  11/16/18 21:00


 12/16/18 20:59   


 


 


 Dextrose


  (Dextrose 50%)  50 ml  Q30M  PRN


 IV


 Hypoglycemia  11/16/18 21:00


 12/16/18 20:59   


 


 


 Gadobutrol


  (Gadavist)  7.5 mmol  NOW  PRN


 IV


 Radiology Procedure  11/16/18 21:00


 11/19/18 20:52   


 


 


 Pantoprazole


  (Protonix)  40 mg  DAILY


 IVP


   11/17/18 13:30


 12/17/18 13:29  11/17/18 13:58


 











Assessment/Plan


Problem List:  


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


(2) Anxiety


ICD Codes:  F41.9 - Anxiety disorder, unspecified


SNOMED:  59499195


Assessment/Plan


ativan prn


seroquel prn


raise the head to prevent aspiration











Alex Love MD Nov 17, 2018 23:03

## 2018-11-17 NOTE — HISTORY AND PHYSICAL REPORT
DATE OF ADMISSION:  11/16/2018

HISTORY OF PRESENT ILLNESS:  This is a 77-year-old male, who came to the

emergency room for having unable to talk, having dysphagia, hypernatremia,

and altered mental status.  The patient was found with possible TIA and

CVA.  Also, he has sacral decubiti stage IV.  Discussed with the charge

nurse and claiming that he came from home.  This patient is unable to

talk.  He opens his eyes and making some movements of both hand and leg,

but it looks like weakness on the left side.



PAST MEDICAL HISTORY:  Sacral decubiti, malnutrition, and

dehydrated.



PHYSICAL EXAMINATION:

GENERAL:  This is an elderly  male, who is currently

nonverbal and bedbound.

VITAL SIGNS:  Blood pressure 93/55, pulse 65, respirations 20, and

temperature 97.2 degrees.

SKIN:  Good skin turgor.

HEENT:  AT/NC.  EOMI.  Eyes are open.

NECK:  Supple.

CHEST:  Bilateral crackles.

CARDIOVASCULAR:  Regular rhythm.  No gallop.  No murmur.

ABDOMEN:  Soft.  Positive bowel sounds.  Nontender.

EXTREMITIES:  No CCE.

NEUROLOGIC:  The patient has generalized weakness, but more weak on the

left leg as well as right hand.

SKIN:  He has sacral decubiti stage IV.



LABORATORY AND DIAGNOSTIC DATA:  White count 6.4, hemoglobin 12, hematocrit

34, and platelets are _____ and now 79,000.  His chemistry panel, sodium

157, potassium 3.7, BUN 39, and creatinine 1.3.  Lactic acid was 3.9

_____.  Troponin 0.06.  Imaging study is showing his head CT is negative.

Chest x-ray is showing no acute process.



ASSESSMENT:

1. Altered mental status.

2. CVA, possible TIA.

3. Acute renal failure.

4. Severe malnutrition.

5. Dehydration.

6. Hypernatremia.



PLAN:  We will admit on medical floor.  Continue IV fluid.  N.p.o.  We will

put NG tube and feed and medical treatment, and start pureed diet.  The

patient has been eating apple sauce.  We will talk to the nurse and we

will currently consider Neurology consult, MRI of the brain, carotid flow.

Consider also Cardiology and Nephrology as well as Hematology/Oncology

consult.  Wound care.









  ______________________________________________

  Todd Spear M.D.





DR:  JEFF

D:  11/17/2018 11:36

T:  11/17/2018 19:21

JOB#:  [id number]

CC:

## 2018-11-17 NOTE — CONSULTATION
Consult Note


Consult Note


HEMATOLOGY-ONCOLOGY CONSULTATION 





REFERRING MD: Ke Spear 


REASON FOR CONSULT: Thrombocytopenia 


DATE OF CONSULT: 11/17/2018





HPI: 77 year old male with multiple medical comorbidities currently admitted 

for medical care and management. Upon admission noted to have a sacral 

decubitus ulcer. Patient seen, chart reviewed, patient examined. Pt currently 

awake and alert but not very responsive. Hematology services consulted for the 

evaluation of thrombocytopenia. Current Plt count at 79.





Medication History


Scheduled


Lorazepam (Lorazepam), 2 MG ORAL THREE TIMES A DAY, (Reported)





Patient History


Limited by:  medical condition


History Provided By:  Medical Record, PMD


Healthcare decision maker





Resuscitation status


Full Code


Advanced Directive on File


No





Past Medical/Surgical History


Past Medical/Surgical History:  


(1) Dehydration


(2) Dysphagia


(3) Hypernatremia


(4) Altered mental status


(5) CVA (cerebral vascular accident)





 ROS 


Review of Systems


All Other Systems:  negative except mentioned in HPI





 Physical Exam 


Physical Exam


General Appearance:  no apparent distress


Lines, tubes and drains:  peripheral


HEENT:  mucous membranes moist


Neck:  normal inspection


Respiratory/Chest:  normal breath sounds, no respiratory distress


Cardiovascular/Chest:  normal rate


Abdomen:  soft, no organomegaly, no mass


Extremities:  non-tender, other


Skin Exam:  other


Neurologic:  alert





Last 24 Hour Vital Signs








  Date Time  Temp Pulse Resp B/P (MAP) Pulse Ox O2 Delivery O2 Flow Rate FiO2


 


11/17/18 07:56  66      


 


11/17/18 07:56 97.2 65 20 93/55 (68) 98   


 


11/17/18 07:25      Room Air  


 


11/17/18 04:00  57      


 


11/17/18 04:00 97.2 60 20 110/62 (78) 98   


 


11/17/18 00:00  65      


 


11/17/18 00:00 97.0 97 20 130/67 (88) 96   


 


11/16/18 21:29      Room Air  


 


11/16/18 21:26      Room Air  


 


11/16/18 20:00 97.1 60 20 96/67 (77) 99   


 


11/16/18 20:00  56      


 


11/16/18 18:25 96.3 58 21 113/54 (73) 99   


 


11/16/18 17:11  57 19 112/91 100 Room Air  


 


11/16/18 13:17  58 12   Room Air  


 


11/16/18 13:14  58 12 106/85 100 Room Air  


 


11/16/18 11:34  66 12   Room Air  

















Intake and Output  


 


 11/16/18 11/17/18





 19:00 07:00


 


Intake Total 1000 ml 628 ml


 


Balance 1000 ml 628 ml


 


  


 


Intake IV Total 1000 ml 628 ml


 


Other 0 ml 


 


# Voids  1


 


# Bowel Movements  1











Laboratory Tests








Test


  11/16/18


12:20 11/16/18


12:23 11/16/18


14:30 11/17/18


06:48


 


White Blood Count


  7.6 K/UL


(4.8-10.8) 


  


  6.4 K/UL


(4.8-10.8)


 


Red Blood Count


  4.53 M/UL


(4.70-6.10)  L 


  


  4.09 M/UL


(4.70-6.10)  L


 


Hemoglobin


  13.1 G/DL


(14.2-18.0)  L 


  


  11.6 G/DL


(14.2-18.0)  L


 


Hematocrit


  38.1 %


(42.0-52.0)  L 


  


  34.3 %


(42.0-52.0)  L


 


Mean Corpuscular Volume 84 FL (80-99)     84 FL (80-99)  


 


Mean Corpuscular Hemoglobin


  28.9 PG


(27.0-31.0) 


  


  28.3 PG


(27.0-31.0)


 


Mean Corpuscular Hemoglobin


Concent 34.4 G/DL


(32.0-36.0) 


  


  33.7 G/DL


(32.0-36.0)


 


Red Cell Distribution Width


  12.4 %


(11.6-14.8) 


  


  12.2 %


(11.6-14.8)


 


Platelet Count


  102 K/UL


(150-450)  L 


  


  79 K/UL


(150-450)  L


 


Mean Platelet Volume


  9.2 FL


(6.5-10.1) 


  


  10.2 FL


(6.5-10.1)  H


 


Neutrophils (%) (Auto)


  % (45.0-75.0)


  


  


  % (45.0-75.0)


 


 


Lymphocytes (%) (Auto)


  % (20.0-45.0)


  


  


  % (20.0-45.0)


 


 


Monocytes (%) (Auto)  % (1.0-10.0)      % (1.0-10.0)  


 


Eosinophils (%) (Auto)  % (0.0-3.0)      % (0.0-3.0)  


 


Basophils (%) (Auto)  % (0.0-2.0)      % (0.0-2.0)  


 


Differential Total Cells


Counted 100  


  


  


  


 


 


Neutrophils % (Manual) 92 % (45-75)  H   Pending  


 


Lymphocytes % (Manual) 3 % (20-45)  L   Pending  


 


Monocytes % (Manual) 5 % (1-10)     


 


Eosinophils % (Manual) 0 % (0-3)     


 


Basophils % (Manual) 0 % (0-2)     


 


Band Neutrophils 0 % (0-8)     


 


Platelet Estimate Decreased  L   Pending  


 


Platelet Morphology Normal     Pending  


 


Red Blood Cell Morphology Normal     


 


Prothrombin Time


  11.7 SEC


(9.30-11.50)  H 


  


  


 


 


Prothromb Time International


Ratio 1.1 (0.9-1.1)  


  


  


  


 


 


Activated Partial


Thromboplast Time 30 SEC (23-33)


  


  


  


 


 


Sodium Level


  153 MMOL/L


(136-145)  H 


  


  157 MMOL/L


(136-145)  H


 


Potassium Level


  3.5 MMOL/L


(3.5-5.1) 


  


  3.7 MMOL/L


(3.5-5.1)


 


Chloride Level


  114 MMOL/L


()  H 


  


  120 MMOL/L


()  H


 


Carbon Dioxide Level


  28 MMOL/L


(21-32) 


  


  25 MMOL/L


(21-32)


 


Anion Gap


  12 mmol/L


(5-15) 


  


  12 mmol/L


(5-15)


 


Blood Urea Nitrogen


  44 mg/dL


(7-18)  H 


  


  39 mg/dL


(7-18)  H


 


Creatinine


  1.4 MG/DL


(0.55-1.30)  H 


  


  1.3 MG/DL


(0.55-1.30)


 


Estimat Glomerular Filtration


Rate  mL/min (>60)  


  


  


   mL/min (>60)  


 


 


Glucose Level


  89 MG/DL


() 


  


  60 MG/DL


()  L


 


Calcium Level


  9.3 MG/DL


(8.5-10.1) 


  


  8.5 MG/DL


(8.5-10.1)


 


Phosphorus Level


  4.0 MG/DL


(2.5-4.9) 


  


  


 


 


Magnesium Level


  2.5 MG/DL


(1.8-2.4)  H 


  


  


 


 


Total Bilirubin


  0.2 MG/DL


(0.2-1.0) 


  


  


 


 


Aspartate Amino Transf


(AST/SGOT) 55 U/L (15-37)


H 


  


  


 


 


Alanine Aminotransferase


(ALT/SGPT) 78 U/L (12-78)


  


  


  


 


 


Alkaline Phosphatase


  45 U/L


()  L 


  


  


 


 


Total Creatine Kinase


  95 U/L


() 


  


  


 


 


Creatine Kinase MB


  11.4 NG/ML


(0.0-3.6)  H 


  


  


 


 


Creatine Kinase MB Relative


Index 12.0  


  


  


  


 


 


Troponin I


  0.006 ng/mL


(0.000-0.056) 


  


  


 


 


Total Protein


  7.2 G/DL


(6.4-8.2) 


  


  


 


 


Albumin


  3.0 G/DL


(3.4-5.0)  L 


  


  


 


 


Globulin 4.2 g/dL     


 


Albumin/Globulin Ratio


  0.7 (1.0-2.7)


L 


  


  


 


 


Lactic Acid Level


  


  3.90 mmol/L


(0.4-2.0)  H 4.10 mmol/L


(0.66-2.22)  H 


 








Height (Feet):  5


Height (Inches):  7.00


Weight (Pounds):  100


Medications





Current Medications








 Medications


  (Trade)  Dose


 Ordered  Sig/[place]


 Route


 PRN Reason  Start Time


 Stop Time Status Last Admin


Dose Admin


 


 Acetaminophen


  (Tylenol)  650 mg  Q4H  PRN


 ORAL


 Mild Pain (Pain Scale 1-3)  11/16/18 21:00


 12/16/18 20:59   


 


 


 Aspirin


  (ASA)  81 mg  DAILY


 ORAL


   11/17/18 09:00


 12/17/18 08:59  11/17/18 08:08


 


 


 Dextrose


  (Dextrose 50%)  25 ml  Q30M  PRN


 IV


 Hypoglycemia  11/16/18 21:00


 12/16/18 20:59   


 


 


 Dextrose


  (Dextrose 50%)  50 ml  Q30M  PRN


 IV


 Hypoglycemia  11/16/18 21:00


 12/16/18 20:59   


 


 


 Gadobutrol


  (Gadavist)  7.5 mmol  NOW  PRN


 IV


 Radiology Procedure  11/16/18 21:00


 11/19/18 20:52   


 


 


 Sodium Chloride  1,000 ml @ 


 75 mls/hr  [id number]


 IV


   11/16/18 21:30


 12/16/18 21:29  11/16/18 22:37


 











ASSESSMENT AND RECOMMENDATIONS 





# Thrombocytopenia - potential causes multifactorial, has lactic acidosis, with 

elevated lactate, on abx, evaluate liver and viral etiologies to begin. Also 

could be related to meds pt has received,


--> Cont tot monitor for improvement 


--> peripheral smear ordered to evaluate for balsts 


--> Hep panel and HIV ordered 


--> US abd to evaluate for cirrhosis and hsm ordered


--> Meds have been reviewed


--> Ok for ppx if Plt count <20k abd fever, or if plt <10k without fever 


# Anemia, mild. Hgb >11, no w/u required at this time.


--> Cont to monitor for stability 


# Sacral decub ulcer. Surgery is following, appreciate recs. 


--> Resolving stage 4 full thickness sacral decubitus ulcer.  Seems to have had 

prior debridement and care.  Seems to be healing well over time.  Chronic.  no 

odor.  no significant drainage.  no signs of infection.  wound bed clean with 

granulation tissue.  3x4.  karen-wound clean.  


--> Wash sacral decubitus ulcer daily with NS, apply hydrogel, pack with gauze, 

apply foam dressing daily and prn


# CVA.


# Dehydration. IVF 


# Dysphagia. 








GREATLY APPRECIATE CONSULTATION.











Ran Álvarez MD Nov 17, 2018 13:33

## 2018-11-18 VITALS — SYSTOLIC BLOOD PRESSURE: 99 MMHG | DIASTOLIC BLOOD PRESSURE: 47 MMHG

## 2018-11-18 VITALS — DIASTOLIC BLOOD PRESSURE: 59 MMHG | SYSTOLIC BLOOD PRESSURE: 123 MMHG

## 2018-11-18 VITALS — SYSTOLIC BLOOD PRESSURE: 110 MMHG | DIASTOLIC BLOOD PRESSURE: 60 MMHG

## 2018-11-18 VITALS — SYSTOLIC BLOOD PRESSURE: 91 MMHG | DIASTOLIC BLOOD PRESSURE: 60 MMHG

## 2018-11-18 VITALS — SYSTOLIC BLOOD PRESSURE: 100 MMHG | DIASTOLIC BLOOD PRESSURE: 49 MMHG

## 2018-11-18 VITALS — SYSTOLIC BLOOD PRESSURE: 105 MMHG | DIASTOLIC BLOOD PRESSURE: 62 MMHG

## 2018-11-18 LAB
% IRON SATURATION: 42 % (ref 15–50)
ADD MANUAL DIFF: YES
ALBUMIN SERPL-MCNC: 2.5 G/DL (ref 3.4–5)
ALBUMIN/GLOB SERPL: 0.8 {RATIO} (ref 1–2.7)
ALP SERPL-CCNC: 38 U/L (ref 46–116)
ALT SERPL-CCNC: 44 U/L (ref 12–78)
ANION GAP SERPL CALC-SCNC: 9 MMOL/L (ref 5–15)
AST SERPL-CCNC: 33 U/L (ref 15–37)
BILIRUB SERPL-MCNC: 0.2 MG/DL (ref 0.2–1)
BUN SERPL-MCNC: 34 MG/DL (ref 7–18)
CALCIUM SERPL-MCNC: 8.3 MG/DL (ref 8.5–10.1)
CHLORIDE SERPL-SCNC: 114 MMOL/L (ref 98–107)
CHOLEST SERPL-MCNC: 92 MG/DL (ref ?–200)
CO2 SERPL-SCNC: 27 MMOL/L (ref 21–32)
CREAT SERPL-MCNC: 1.4 MG/DL (ref 0.55–1.3)
ERYTHROCYTE [DISTWIDTH] IN BLOOD BY AUTOMATED COUNT: 12.8 % (ref 11.6–14.8)
FERRITIN SERPL-MCNC: 594 NG/ML (ref 8–388)
GAMMA GLUTAMYL TRANSPEPTIDASE: 10 U/L (ref 5–85)
GLOBULIN SER-MCNC: 3.1 G/DL
HCT VFR BLD CALC: 29.5 % (ref 42–52)
HDLC SERPL-MCNC: 73 MG/DL (ref 40–60)
HGB BLD-MCNC: 9.9 G/DL (ref 14.2–18)
IRON SERPL-MCNC: 39 UG/DL (ref 50–175)
MCV RBC AUTO: 84 FL (ref 80–99)
PHOSPHATE SERPL-MCNC: 2.7 MG/DL (ref 2.5–4.9)
PLATELET # BLD: 64 K/UL (ref 150–450)
POTASSIUM SERPL-SCNC: 3.1 MMOL/L (ref 3.5–5.1)
RBC # BLD AUTO: 3.53 M/UL (ref 4.7–6.1)
SODIUM SERPL-SCNC: 150 MMOL/L (ref 136–145)
TIBC SERPL-MCNC: 92 UG/DL (ref 250–450)
TRIGL SERPL-MCNC: 15 MG/DL (ref 30–150)
UNSATURATED IRON BINDING: 53 UG/DL (ref 112–346)
WBC # BLD AUTO: 5.3 K/UL (ref 4.8–10.8)

## 2018-11-18 RX ADMIN — PANTOPRAZOLE SODIUM SCH MG: 40 INJECTION, POWDER, FOR SOLUTION INTRAVENOUS at 09:55

## 2018-11-18 RX ADMIN — ATORVASTATIN CALCIUM SCH MG: 20 TABLET, FILM COATED ORAL at 21:31

## 2018-11-18 RX ADMIN — ASPIRIN 81 MG SCH MG: 81 TABLET ORAL at 09:56

## 2018-11-18 NOTE — PROGRESS NOTE
DATE:  11/18/2018

SUBJECTIVE:  This is an elderly male, sitting in the bed, nonverbal, opens

his eyes and mouth, but unable to swallow.  He has been NPO.  The patient

also have some tremors, but is not following commands.



OBJECTIVE:

VITAL SIGNS:  Blood pressure is 99/47, asymptomatic, pulse 67, respirations

18.  No fever.

SKIN:  Good skin turgor.

HEENT:  Eyes are open.

NECK:  Supple.

CHEST:  Bilateral few crackles.

CARDIOVASCULAR:  Regular rhythm.  No gallop.  No murmur.

ABDOMEN:  Soft.

EXTREMITIES:  CCE.

NEUROLOGIC:  Generalized weakness.



LABORATORY DATA:  His white count is 5.3, hemoglobin 10, hematocrit 23, and

platelets are 64, is going down.  His urine test also showing ketones, 5+

blood, 2+ leukocyte esterase, and wbc 5 to 10.



ASSESSMENT AND PLAN:

1. Altered mental status.

2. UTI.

3. Dehydration.

4. Encephalopathy.

5. Hypernatremia.

6. Dehydration.



We will currently continue NPO.  Had NG tube feeding.  Continue IV

fluid.  Consider Neurology consult and we will talk to the family for his

home medications.  PT and OT, _____ swallow.



We will also put him on antibiotics and monitor his platelets.

Hematology/Oncology consult and consider also GI consult because his

hemoglobin has dropped from 13 to 9.  Discussed with the charge nurse.









  ______________________________________________

  Todd Spear M.D.





DR:  DAWNA

D:  11/18/2018 13:34

T:  11/19/2018 03:12

JOB#:  [id number]

CC:

## 2018-11-18 NOTE — GENERAL PROGRESS NOTE
Assessment/Plan


Problem List:  


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


(2) Anxiety


ICD Codes:  F41.9 - Anxiety disorder, unspecified


SNOMED:  34326103


Status:  stable


Assessment/Plan


ativan prn


seroquel prn


raise the head to prevent aspiration





Subjective


Neurologic/Psychiatric:  Reports: anxiety, depressed


Allergies:  


Coded Allergies:  


     No Known Allergies (Unverified , 11/16/18)





Objective





Last 24 Hour Vital Signs








  Date Time  Temp Pulse Resp B/P (MAP) Pulse Ox O2 Delivery O2 Flow Rate FiO2


 


11/18/18 21:00      Room Air  


 


11/18/18 19:12  61      


 


11/18/18 16:00  63 18 100/49 (66) 95   


 


11/18/18 16:00  61      


 


11/18/18 12:00  67      


 


11/18/18 12:00  67 18 99/47 (64) 94   


 


11/18/18 09:00      Room Air  


 


11/18/18 08:00  64      


 


11/18/18 08:00 97.4 66 18 123/59 (80) 93   


 


11/18/18 04:00  65      


 


11/18/18 04:00 97.0 62 20 105/62 (76) 97   


 


11/18/18 00:00 96.8 72 20 91/60 (70) 98   


 


11/18/18 00:00  70      

















Intake and Output  


 


 11/17/18 11/18/18





 19:00 07:00


 


Intake Total 650 ml 998 ml


 


Output Total 700 ml 440 ml


 


Balance -50 ml 558 ml


 


  


 


Intake IV Total 650 ml 998 ml


 


Output Urine Total 700 ml 440 ml








Laboratory Tests


11/18/18 05:34: 


White Blood Count 5.3, Red Blood Count 3.53L, Hemoglobin 9.9L, Hematocrit 29.5L

, Mean Corpuscular Volume 84, Mean Corpuscular Hemoglobin 28.1, Mean 

Corpuscular Hemoglobin Concent 33.7, Red Cell Distribution Width 12.8, Platelet 

Count 64L, Mean Platelet Volume 10.3H, Neutrophils (%) (Auto) , Lymphocytes (%) 

(Auto) , Monocytes (%) (Auto) , Eosinophils (%) (Auto) , Basophils (%) (Auto) , 

Differential Total Cells Counted 100, Neutrophils % (Manual) 80H, Lymphocytes % 

(Manual) 8L, Monocytes % (Manual) 2, Eosinophils % (Manual) 1, Basophils % (

Manual) 0, Band Neutrophils 9H, Platelet Estimate DecreasedL, Platelet 

Morphology , Giant Platelets Rare, Hypochromasia 1+, Sodium Level 150H, 

Potassium Level 3.1L, Chloride Level 114H, Carbon Dioxide Level 27, Anion Gap 9

, Blood Urea Nitrogen 34H, Creatinine 1.4H, Estimat Glomerular Filtration Rate 

, Glucose Level 68L, Hemoglobin A1c 5.5, Uric Acid 5.8, Calcium Level 8.3L, 

Phosphorus Level 2.7, Magnesium Level 2.0, Iron Level 39L, Total Iron Binding 

Capacity 92L, Percent Iron Saturation 42, Unsaturated Iron Binding 53L, 

Ferritin 594H, Total Bilirubin 0.2, Gamma Glutamyl Transpeptidase 10, Aspartate 

Amino Transf (AST/SGOT) 33, Alanine Aminotransferase (ALT/SGPT) 44, Alkaline 

Phosphatase 38L, Pro-B-Type Natriuretic Peptide 495H, Total Protein 5.6L, 

Albumin 2.5L, Globulin 3.1, Albumin/Globulin Ratio 0.8L, Triglycerides Level 15L

, Cholesterol Level 92, LDL Cholesterol 24, HDL Cholesterol 73H, Cholesterol/

HDL Ratio 1.3L, Vitamin B12 Level > 2000H, Folate 13.9, Thyroid Stimulating 

Hormone (TSH) 5.419H


Height (Feet):  5


Height (Inches):  7.00


Weight (Pounds):  100


General Appearance:  no apparent distress, alert, confused











Alex Love MD Nov 18, 2018 23:51

## 2018-11-18 NOTE — GENERAL PROGRESS NOTE
Assessment/Plan


Status:  stable


Assessment/Plan


# Thrombocytopenia - potential causes multifactorial, has lactic acidosis, with 

elevated lactate, on abx, evaluate liver and viral etiologies to begin. Also 

could be related to meds pt has received,


--> Currently, plt remains low 


--> Cont tot monitor for improvement 


--> peripheral smear reviewed, no blasts 


--> Hep panel pending,HIV negative  


--> US abd to evaluate for cirrhosis and hsm ordered - pending 


--> Meds have been reviewed


--> Ok for ppx if Plt count <20k abd fever, or if plt <10k without fever 


# Anemia of chronic disease due to underlying chronic medical issues, 

multifactorial. 


--> Anemia w/u has been reviewed. Ferritin at 594


--> Will trend cbc daily


--> Hgb goal >7. Transfuse prn. 


--> No evidence of hemolysis, peripheral smear has been reviewed 


# Sacral decub ulcer. Surgery is following, appreciate recs. 


--> Resolving stage 4 full thickness sacral decubitus ulcer.  Seems to have had 

prior debridement and care.  Seems to be healing well over time.  Chronic.  no 

odor.  no significant drainage.  no signs of infection.  wound bed clean with 

granulation tissue.  3x4.  karen-wound clean.  


--> Wash sacral decubitus ulcer daily with NS, apply hydrogel, pack with gauze, 

apply foam dressing daily and prn


# CVA.


# Dehydration. IVF 


# Dysphagia. 








GREATLY APPRECIATE CONSULTATION.





Subjective


Date patient seen:  Nov 18, 2018


Hematologic/Lymphatic:  Reports: anemia


Allergies:  


Coded Allergies:  


     No Known Allergies (Unverified , 11/16/18)


All Systems:  reviewed and negative except above


Subjective


Pt in stable condition. No acute events. H/H stable.





Objective





Last 24 Hour Vital Signs








  Date Time  Temp Pulse Resp B/P (MAP) Pulse Ox O2 Delivery O2 Flow Rate FiO2


 


11/18/18 04:00  65      


 


11/18/18 04:00 97.0 62 20 105/62 (76) 97   


 


11/18/18 00:00 96.8 72 20 91/60 (70) 98   


 


11/18/18 00:00  70      


 


11/17/18 21:00      Room Air  


 


11/17/18 20:00 97.0 67 20 95/56 (69) 98   


 


11/17/18 20:00  66      


 


11/17/18 15:34  73      


 


11/17/18 15:22 97.2 63 20 113/70 (84) 98   





        


 


11/17/18 13:02 97.2 67 20 136/83 (100) 98   


 


11/17/18 11:49  64      

















Intake and Output  


 


 11/17/18 11/18/18





 19:00 07:00


 


Intake Total 650 ml 998 ml


 


Output Total 700 ml 440 ml


 


Balance -50 ml 558 ml


 


  


 


Intake IV Total 650 ml 998 ml


 


Output Urine Total 700 ml 440 ml








Laboratory Tests


11/17/18 17:30: 


Urine Color Brown, Urine Appearance Slightly cloudy, Urine pH 5, Urine Specific 

Gravity 1.020, Urine Protein 3+H, Urine Glucose (UA) Negative, Urine Ketones 1+H

, Urine Blood 5+H, Urine Nitrite Negative, Urine Bilirubin Negative, Urine 

Urobilinogen 1H, Urine Leukocyte Esterase 2+H, Urine RBC 15-20H, Urine WBC 5-10H

, Urine Squamous Epithelial Cells Few, Urine Amorphous Sediment FewH, Urine 

Bacteria ModerateH


11/18/18 05:34: 


White Blood Count 5.3, Red Blood Count 3.53L, Hemoglobin 9.9L, Hematocrit 29.5L

, Mean Corpuscular Volume 84, Mean Corpuscular Hemoglobin 28.1, Mean 

Corpuscular Hemoglobin Concent 33.7, Red Cell Distribution Width 12.8, Platelet 

Count 64L, Mean Platelet Volume 10.3H, Neutrophils (%) (Auto) , Lymphocytes (%) 

(Auto) , Monocytes (%) (Auto) , Eosinophils (%) (Auto) , Basophils (%) (Auto) , 

Differential Total Cells Counted 100, Neutrophils % (Manual) 80H, Lymphocytes % 

(Manual) 8L, Monocytes % (Manual) 2, Eosinophils % (Manual) 1, Basophils % (

Manual) 0, Band Neutrophils 9H, Platelet Estimate DecreasedL, Platelet 

Morphology , Giant Platelets Rare, Hypochromasia 1+, Sodium Level 150H, 

Potassium Level 3.1L, Chloride Level 114H, Carbon Dioxide Level 27, Anion Gap 9

, Blood Urea Nitrogen 34H, Creatinine 1.4H, Estimat Glomerular Filtration Rate 

, Glucose Level 68L, Hemoglobin A1c 5.5, Uric Acid 5.8, Calcium Level 8.3L, 

Phosphorus Level 2.7, Magnesium Level 2.0, Iron Level 39L, Total Iron Binding 

Capacity 92L, Percent Iron Saturation 42, Unsaturated Iron Binding 53L, 

Ferritin 594H, Total Bilirubin 0.2, Gamma Glutamyl Transpeptidase 10, Aspartate 

Amino Transf (AST/SGOT) 33, Alanine Aminotransferase (ALT/SGPT) 44, Alkaline 

Phosphatase 38L, Pro-B-Type Natriuretic Peptide 495H, Total Protein 5.6L, 

Albumin 2.5L, Globulin 3.1, Albumin/Globulin Ratio 0.8L, Triglycerides Level 15L

, Cholesterol Level 92, LDL Cholesterol 24, HDL Cholesterol 73H, Cholesterol/

HDL Ratio 1.3L, Vitamin B12 Level > 2000H, Folate 13.9, Thyroid Stimulating 

Hormone (TSH) 5.419H


Height (Feet):  5


Height (Inches):  7.00


Weight (Pounds):  100


Objective


General Appearance:  no apparent distress


Lines, tubes and drains:  peripheral


HEENT:  mucous membranes moist


Neck:  normal inspection


Respiratory/Chest:  normal breath sounds, no respiratory distress


Cardiovascular/Chest:  normal rate


Abdomen:  soft, no organomegaly, no mass


Extremities:  non-tender, other


Skin Exam:  other


Neurologic:  alert











Ran Álvarez MD Nov 18, 2018 09:32

## 2018-11-18 NOTE — NEPHROLOGY PROGRESS NOTE
Assessment/Plan


Problem List:  


(1) Dehydration


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


(3) CVA (cerebral vascular accident)


(4) Hypernatremia


(5) Urinary retention


Assessment


Altered mental status


CVA (cerebral vascular accident)


Hypernatremia


Dehydration / Urinary retention


Dysphagia


Decubitus ulcer of sacral region, stage 4


Plan


Plan:


 


D5W


Monitor renal parameters


Skin care


brothers





Subjective


ROS Limited/Unobtainable:  No


Constitutional:  Reports: malaise, weakness





Objective


Objective





Last 24 Hour Vital Signs








  Date Time  Temp Pulse Resp B/P (MAP) Pulse Ox O2 Delivery O2 Flow Rate FiO2


 


11/18/18 12:00  67      


 


11/18/18 12:00  67 18 99/47 (64) 94   


 


11/18/18 09:00      Room Air  


 


11/18/18 08:00  64      


 


11/18/18 08:00 97.4 66 18 123/59 (80) 93   


 


11/18/18 04:00  65      


 


11/18/18 04:00 97.0 62 20 105/62 (76) 97   


 


11/18/18 00:00 96.8 72 20 91/60 (70) 98   


 


11/18/18 00:00  70      


 


11/17/18 21:00      Room Air  


 


11/17/18 20:00 97.0 67 20 95/56 (69) 98   


 


11/17/18 20:00  66      


 


11/17/18 15:34  73      


 


11/17/18 15:22 97.2 63 20 113/70 (84) 98   





        


 


11/17/18 13:02 97.2 67 20 136/83 (100) 98   

















Intake and Output  


 


 11/17/18 11/18/18





 19:00 07:00


 


Intake Total 650 ml 998 ml


 


Output Total 700 ml 440 ml


 


Balance -50 ml 558 ml


 


  


 


Intake IV Total 650 ml 998 ml


 


Output Urine Total 700 ml 440 ml








Laboratory Tests


11/17/18 17:30: 


Urine Color Brown, Urine Appearance Slightly cloudy, Urine pH 5, Urine Specific 

Gravity 1.020, Urine Protein 3+H, Urine Glucose (UA) Negative, Urine Ketones 1+H

, Urine Blood 5+H, Urine Nitrite Negative, Urine Bilirubin Negative, Urine 

Urobilinogen 1H, Urine Leukocyte Esterase 2+H, Urine RBC 15-20H, Urine WBC 5-10H

, Urine Squamous Epithelial Cells Few, Urine Amorphous Sediment FewH, Urine 

Bacteria ModerateH


11/18/18 05:34: 


White Blood Count 5.3, Red Blood Count 3.53L, Hemoglobin 9.9L, Hematocrit 29.5L

, Mean Corpuscular Volume 84, Mean Corpuscular Hemoglobin 28.1, Mean 

Corpuscular Hemoglobin Concent 33.7, Red Cell Distribution Width 12.8, Platelet 

Count 64L, Mean Platelet Volume 10.3H, Neutrophils (%) (Auto) , Lymphocytes (%) 

(Auto) , Monocytes (%) (Auto) , Eosinophils (%) (Auto) , Basophils (%) (Auto) , 

Differential Total Cells Counted 100, Neutrophils % (Manual) 80H, Lymphocytes % 

(Manual) 8L, Monocytes % (Manual) 2, Eosinophils % (Manual) 1, Basophils % (

Manual) 0, Band Neutrophils 9H, Platelet Estimate DecreasedL, Platelet 

Morphology , Giant Platelets Rare, Hypochromasia 1+, Sodium Level 150H, 

Potassium Level 3.1L, Chloride Level 114H, Carbon Dioxide Level 27, Anion Gap 9

, Blood Urea Nitrogen 34H, Creatinine 1.4H, Estimat Glomerular Filtration Rate 

, Glucose Level 68L, Hemoglobin A1c 5.5, Uric Acid 5.8, Calcium Level 8.3L, 

Phosphorus Level 2.7, Magnesium Level 2.0, Iron Level 39L, Total Iron Binding 

Capacity 92L, Percent Iron Saturation 42, Unsaturated Iron Binding 53L, 

Ferritin 594H, Total Bilirubin 0.2, Gamma Glutamyl Transpeptidase 10, Aspartate 

Amino Transf (AST/SGOT) 33, Alanine Aminotransferase (ALT/SGPT) 44, Alkaline 

Phosphatase 38L, Pro-B-Type Natriuretic Peptide 495H, Total Protein 5.6L, 

Albumin 2.5L, Globulin 3.1, Albumin/Globulin Ratio 0.8L, Triglycerides Level 15L

, Cholesterol Level 92, LDL Cholesterol 24, HDL Cholesterol 73H, Cholesterol/

HDL Ratio 1.3L, Vitamin B12 Level > 2000H, Folate 13.9, Thyroid Stimulating 

Hormone (TSH) 5.419H


Height (Feet):  5


Height (Inches):  7.00


Weight (Pounds):  100


General Appearance:  no apparent distress


Cardiovascular:  normal rate


Respiratory/Chest:  decreased breath sounds


Abdomen:  distended











Chris Elmore MD Nov 18, 2018 12:50

## 2018-11-18 NOTE — CONSULTATION
DATE OF CONSULTATION:  11/18/2018

CARDIOLOGY CONSULTATION



CONSULTING PHYSICIAN:  Victor M Gallardo M.D.



REFERRING PHYSICIAN:  Ke Spear M.D.



REASON FOR CONSULTATION:  Management of hypotension.



HISTORY OF PRESENT ILLNESS:  The patient is a very unfortunate 77-year-old

gentleman with multiple medical problems, who was transferred from the

skilled nursing facility for inability to verbalize as well as a

electrolyte abnormality and altered level of consciousness.  In the

emergency department, initial workup was done, which was a CT of head that

revealed chronic and age-related changes, left external capsule region

lacunar infarct age indeterminate, possibly old and negative for acute

intracranial bleed or mass effect.  His blood pressure at the time of

arrival to the hospital was 106/85, this pressure dropped while he was in

the emergency department to a value of 96/67 mmHg.  His heart rate was

within normal limits at 66, respiration of 12 on room air, O2 saturation

was also 100%.  Some initial laboratory was done in the emergency

department, which revealed presence of hypernatremia with serum sodium of

153 and evidence of renal failure with BUN and creatinine of 44 and 1.4

respectively.  His troponin I level was at 0.006.  The patient was

admitted to the telemetry for further evaluation and management of

possible hypovolemia due to severe intravascular volume depletion as well

as acute kidney injury.



PAST MEDICAL HISTORY:  Includes,



1. CVA.

2. Sacral decubitus.

3. Malnutrition.



MEDICATIONS:  List of medication includes lorazepam 2 mg three times a

day.



FAMILY HISTORY:  No premature coronary artery disease or arrhythmogenic

death in first-degree relative.



SOCIAL HISTORY:  There is no history of tobacco, alcohol, or illicit drug

use.



ALLERGIES:  No known drug allergies.



REVIEW OF SYSTEMS:  Unable to obtain as the patient is nonverbal.



PHYSICAL EXAMINATION:

VITAL SIGNS:  Blood pressure was 96/67, heart rate of 60, respirations 20,

and O2 saturation 99% to 100% on room air.

GENERAL:  The patient is a very unfortunate 77-year-old gentleman, who is

awake and not following commands.  Nonverbal.  Mouth open.  NG tube in

place.

HEENT:  Atraumatic and normocephalic.  Anicteric.  Pupils are equal, round,

and reactive to light and accommodation.  Extraocular muscles intact.

NECK:  JVP is less than 5 cm.  No carotid bruit.  Carotid upstrokes 2+

bilaterally.

CARDIOVASCULAR:  Normal S1, S2.  Regular rate and rhythm.  No murmurs,

gallops, or rubs.  PMI is at fourth intercostal space at the midclavicular

line.

LUNGS:  Clear to auscultation bilaterally.

ABDOMEN:  Soft, nontender, and nondistended.  No hepatosplenomegaly.

Positive bowel sounds.

EXTREMITIES:  No evidence of edema, clubbing, or cyanosis.



LABORATORY AND DIAGNOSTIC DATA:  Laboratory findings, sodium was 153,

potassium is 3.5, chloride 114, bicarbonate 28, BUN of 44, creatinine 1.4,

glucose 89, calcium is 9.3, and magnesium 2.5.  Troponin I was 0.006.  WBC

7.6, hemoglobin 13.1, hematocrit 38.1, and platelet count was 102,000.

INR was 101.  CT of the head was negative for any intracranial pathology,

however, left external capsule lacunar infarct was noted.  Chest x-ray

showed no acute cardiopulmonary disease with hyperinflated lungs and

tortuous aorta.



ASSESSMENT AND PLAN:  The patient is a very unfortunate 77-year-old

gentleman, seen in Cardiology consultation at request of Dr. Spear.



1. Hypotension, secondary to intravascular volume depletion as the

patient shows free water deficit on laboratory findings _____ verified by

very significant high serum sodium level.  The patient will benefit from

hypotonic infusion and electrolyte replacement.  Also accordingly,

prerenal azotemia, which hopefully responds to intravenous fluids and

increased renal blood flow.

2. I would like to all obtain 2D echocardiography for assessment of LV

systolic function.

3. Dysphagia, prior history of CVA.  CT of head did not show any acute

changes, however, the patient would probably benefit from MRI of brain,

which was recommended by the radiologist.

4. At this time, the patient will benefit from aspirin and statins.

5. Prerenal azotemia/acute kidney injury.

6. Anemia with thrombocytopenia.

7. Failure to thrive.



I would like to thank, Dr. Spear, for courtesy of this consultation.









  ______________________________________________

  Victor M Gallardo M.D.





DR:  JANES

D:  11/18/2018 20:10

T:  11/18/2018 21:02

JOB#:  [id number]

CC:

## 2018-11-18 NOTE — CARDIOLOGY PROGRESS NOTE
Assessment/Plan


Assessment/Plan


The patient is seen and examined, full consult note will be dictated.





Objective





Last 24 Hour Vital Signs








  Date Time  Temp Pulse Resp B/P (MAP) Pulse Ox O2 Delivery O2 Flow Rate FiO2


 


11/18/18 16:00  63 18 100/49 (66) 95   


 


11/18/18 16:00  61      


 


11/18/18 12:00  67      


 


11/18/18 12:00  67 18 99/47 (64) 94   


 


11/18/18 09:00      Room Air  


 


11/18/18 08:00  64      


 


11/18/18 08:00 97.4 66 18 123/59 (80) 93   


 


11/18/18 04:00  65      


 


11/18/18 04:00 97.0 62 20 105/62 (76) 97   


 


11/18/18 00:00 96.8 72 20 91/60 (70) 98   


 


11/18/18 00:00  70      


 


11/17/18 21:00      Room Air  


 


11/17/18 20:00 97.0 67 20 95/56 (69) 98   


 


11/17/18 20:00  66      

















Intake and Output  


 


 11/17/18 11/18/18





 19:00 07:00


 


Intake Total 650 ml 998 ml


 


Output Total 700 ml 440 ml


 


Balance -50 ml 558 ml


 


  


 


Intake IV Total 650 ml 998 ml


 


Output Urine Total 700 ml 440 ml











Laboratory Tests








Test


  11/18/18


05:34


 


White Blood Count


  5.3 K/UL


(4.8-10.8)


 


Red Blood Count


  3.53 M/UL


(4.70-6.10)  L


 


Hemoglobin


  9.9 G/DL


(14.2-18.0)  L


 


Hematocrit


  29.5 %


(42.0-52.0)  L


 


Mean Corpuscular Volume 84 FL (80-99)  


 


Mean Corpuscular Hemoglobin


  28.1 PG


(27.0-31.0)


 


Mean Corpuscular Hemoglobin


Concent 33.7 G/DL


(32.0-36.0)


 


Red Cell Distribution Width


  12.8 %


(11.6-14.8)


 


Platelet Count


  64 K/UL


(150-450)  L


 


Mean Platelet Volume


  10.3 FL


(6.5-10.1)  H


 


Neutrophils (%) (Auto)


  % (45.0-75.0)


 


 


Lymphocytes (%) (Auto)


  % (20.0-45.0)


 


 


Monocytes (%) (Auto)  % (1.0-10.0)  


 


Eosinophils (%) (Auto)  % (0.0-3.0)  


 


Basophils (%) (Auto)  % (0.0-2.0)  


 


Differential Total Cells


Counted 100  


 


 


Neutrophils % (Manual) 80 % (45-75)  H


 


Lymphocytes % (Manual) 8 % (20-45)  L


 


Monocytes % (Manual) 2 % (1-10)  


 


Eosinophils % (Manual) 1 % (0-3)  


 


Basophils % (Manual) 0 % (0-2)  


 


Band Neutrophils 9 % (0-8)  H


 


Platelet Estimate Decreased  L


 


Platelet Morphology   


 


Giant Platelets Rare  


 


Hypochromasia 1+  


 


Sodium Level


  150 MMOL/L


(136-145)  H


 


Potassium Level


  3.1 MMOL/L


(3.5-5.1)  L


 


Chloride Level


  114 MMOL/L


()  H


 


Carbon Dioxide Level


  27 MMOL/L


(21-32)


 


Anion Gap


  9 mmol/L


(5-15)


 


Blood Urea Nitrogen


  34 mg/dL


(7-18)  H


 


Creatinine


  1.4 MG/DL


(0.55-1.30)  H


 


Estimat Glomerular Filtration


Rate  mL/min (>60)  


 


 


Glucose Level


  68 MG/DL


()  L


 


Hemoglobin A1c


  5.5 %


(4.3-6.0)


 


Uric Acid


  5.8 MG/DL


(2.6-7.2)


 


Calcium Level


  8.3 MG/DL


(8.5-10.1)  L


 


Phosphorus Level


  2.7 MG/DL


(2.5-4.9)


 


Magnesium Level


  2.0 MG/DL


(1.8-2.4)


 


Iron Level


  39 ug/dL


()  L


 


Total Iron Binding Capacity


  92 ug/dL


(250-450)  L


 


Percent Iron Saturation 42 % (15-50)  


 


Unsaturated Iron Binding


  53 ug/dL


(112-346)  L


 


Ferritin


  594 NG/ML


(8-388)  H


 


Total Bilirubin


  0.2 MG/DL


(0.2-1.0)


 


Gamma Glutamyl Transpeptidase 10 U/L (5-85)  


 


Aspartate Amino Transf


(AST/SGOT) 33 U/L (15-37)


 


 


Alanine Aminotransferase


(ALT/SGPT) 44 U/L (12-78)


 


 


Alkaline Phosphatase


  38 U/L


()  L


 


Pro-B-Type Natriuretic Peptide


  495 pg/mL


(0-125)  H


 


Total Protein


  5.6 G/DL


(6.4-8.2)  L


 


Albumin


  2.5 G/DL


(3.4-5.0)  L


 


Globulin 3.1 g/dL  


 


Albumin/Globulin Ratio


  0.8 (1.0-2.7)


L


 


Triglycerides Level


  15 MG/DL


()  L


 


Cholesterol Level


  92 MG/DL (<


200)


 


LDL Cholesterol


  24 mg/dL


(<100)


 


HDL Cholesterol


  73 MG/DL


(40-60)  H


 


Cholesterol/HDL Ratio


  1.3 (3.3-4.4)


L


 


Vitamin B12 Level


  > 2000 PG/ML


(193-986)  H


 


Folate


  13.9 NG/ML


(8.6-58.9)


 


Thyroid Stimulating Hormone


(TSH) 5.419 uiU/mL


(0.358-3.740)











Microbiology








 Date/Time


Source Procedure


Growth Status


 


 


 11/16/18 12:30


Blood Blood Culture - Preliminary


NO GROWTH AFTER 24 HOURS Resulted


 


 11/16/18 12:15


Blood Blood Culture - Preliminary


NO GROWTH AFTER 24 HOURS Resulted


 


 11/17/18 17:30


Indwelling Cath Urine Culture - Preliminary


NO GROWTH Resulted

















Victor M Gallardo MD Nov 18, 2018 19:57

## 2018-11-19 VITALS — DIASTOLIC BLOOD PRESSURE: 78 MMHG | SYSTOLIC BLOOD PRESSURE: 112 MMHG

## 2018-11-19 VITALS — SYSTOLIC BLOOD PRESSURE: 110 MMHG | DIASTOLIC BLOOD PRESSURE: 77 MMHG

## 2018-11-19 VITALS — DIASTOLIC BLOOD PRESSURE: 67 MMHG | SYSTOLIC BLOOD PRESSURE: 102 MMHG

## 2018-11-19 VITALS — DIASTOLIC BLOOD PRESSURE: 70 MMHG | SYSTOLIC BLOOD PRESSURE: 100 MMHG

## 2018-11-19 VITALS — SYSTOLIC BLOOD PRESSURE: 119 MMHG | DIASTOLIC BLOOD PRESSURE: 83 MMHG

## 2018-11-19 LAB
ADD MANUAL DIFF: YES
ALBUMIN SERPL-MCNC: 2.7 G/DL (ref 3.4–5)
ALBUMIN/GLOB SERPL: 0.9 {RATIO} (ref 1–2.7)
ALP SERPL-CCNC: 51 U/L (ref 46–116)
ALT SERPL-CCNC: 72 U/L (ref 12–78)
ANION GAP SERPL CALC-SCNC: 9 MMOL/L (ref 5–15)
AST SERPL-CCNC: 80 U/L (ref 15–37)
BILIRUB SERPL-MCNC: 0.5 MG/DL (ref 0.2–1)
BUN SERPL-MCNC: 31 MG/DL (ref 7–18)
CALCIUM SERPL-MCNC: 8.6 MG/DL (ref 8.5–10.1)
CHLORIDE SERPL-SCNC: 111 MMOL/L (ref 98–107)
CO2 SERPL-SCNC: 27 MMOL/L (ref 21–32)
CREAT SERPL-MCNC: 1.3 MG/DL (ref 0.55–1.3)
ERYTHROCYTE [DISTWIDTH] IN BLOOD BY AUTOMATED COUNT: 12.4 % (ref 11.6–14.8)
GLOBULIN SER-MCNC: 3 G/DL
HCT VFR BLD CALC: 32.5 % (ref 42–52)
HGB BLD-MCNC: 11.1 G/DL (ref 14.2–18)
MCV RBC AUTO: 83 FL (ref 80–99)
PHOSPHATE SERPL-MCNC: 2.8 MG/DL (ref 2.5–4.9)
PLATELET # BLD: 58 K/UL (ref 150–450)
POTASSIUM SERPL-SCNC: 3.6 MMOL/L (ref 3.5–5.1)
RBC # BLD AUTO: 3.93 M/UL (ref 4.7–6.1)
SODIUM SERPL-SCNC: 147 MMOL/L (ref 136–145)
WBC # BLD AUTO: 5.7 K/UL (ref 4.8–10.8)

## 2018-11-19 RX ADMIN — ASPIRIN SCH MG: 81 TABLET, DELAYED RELEASE ORAL at 08:54

## 2018-11-19 RX ADMIN — PANTOPRAZOLE SODIUM SCH MG: 40 INJECTION, POWDER, FOR SOLUTION INTRAVENOUS at 08:54

## 2018-11-19 RX ADMIN — LEVOFLOXACIN SCH MLS/HR: 5 INJECTION, SOLUTION INTRAVENOUS at 16:57

## 2018-11-19 RX ADMIN — ATORVASTATIN CALCIUM SCH MG: 20 TABLET, FILM COATED ORAL at 21:06

## 2018-11-19 NOTE — CONSULTATION
DATE OF CONSULTATION:  11/19/2018



NEUROLOGY CONSULTATION



CONSULTING PHYSICIAN:  Jose Bennett M.D.



REQUESTING PHYSICIAN:  Todd Spear M.D.



HISTORY:  

Mr. Jason Summers is a 77-year-old, black gentleman, of 

unknown handedness, who was hospitalized on 11/16/2018 

for an altered mental state and a stage IV sacral decubitus.  



He continued to have a significant alteration in his mental 

state and in addition has exhibited some weakness which 

at one time, it was more left-sided.  



This consultation was requested to evaluate the patient from 

a neurological point of view.



At this point in time, the Mr. Summers is aphasic and unable 

to communicate in any manner. 



It is unclear as to what his baseline mental status at home is.  



He however has stage IV sacral decubitus and most probably 

has not been moving much.  



On hospitalization, he was noted to be significantly 

hypernatremic, had a urinary  tract infection, was nonverbal 

and quadriparetic.



At this point in time, the patient continues to be aphasic, mute, 

and unable to cooperate much for an examination.



PAST MEDICAL HISTORY:  Unavailable.



FAMILY HISTORY:  Unavailable.



PERSONAL HISTORY:  Unavailable.



PRESENT MEDICATIONS: Levofloxacin, aspirin, Lipitor, 

Ativan, pantoprazole, Tylenol.



PHYSICAL EXAMINATION:

GENERAL:  He is a well-developed, ill looking black gentleman, 

lying in bed, exhibiting myoclonic jerks.

VITAL SIGNS:  Pulse 62/minute, blood pressure 119/83 mmHg,

respirations 19/minute, temperature 97 degrees Fahrenheit.

HEAD: Normocephalic and atraumatic. 

EENT: Examination benign.

NECK:  No neck rigidity was observed.



NEUROLOGIC EXAMINATION:

MENTAL STATUS EXAMINATION:  

He was awake, but not alert.  

He was unable to cooperate for further mental status testing 

because he was severely aphasic.  

SPEECH: Could not be tested as he was mute.  

LANGUAGE: Could not be tested because he was severely aphasic.

CRANIAL NERVE EXAMINATION:

II:  He did blink to threat.

III, IV & VI:  External ocular movements were present on

oculocephalic maneuvers.  The pupils were 3 mm in diameter, 

equal, round, regular, and reactive sluggishly to light.

V & VII:  Corneal reflexes were present and symmetrical.

VIII:  He did respond to sounds and had no nystagmus.

IX & X:  The gag reflex was subdued.

XI:  The sternocleidomastoids and trapezii did function.

XII:  The tongue was in the midline.

MOTOR SYSTEM:  The tone was increased in all four 

extremities with a combination of spasticity and gegenhalten.  

Examination of muscle mass revealed generalized muscle wasting.  

Examination of power was impossible to perform on individual 

muscle groups, however, when deep painful stimuli were applied, 

he moved all four extremities minimally.

SENSORY EXAMINATION:  He responded appropriately to 

deep pain.  He was unable to cooperate for the sensory modalities.  

REFLEXES: 0 at the biceps, triceps, brachioradialis, knees, and ankles.  

The plantar responses were flexor bilaterally.  

COORDINATION, STANCE & GAIT: Could not be tested.



DIAGNOSTIC IMPRESSION:  

1. Mr. Jason Summers is a 77-year-old, black gentleman, of 

unknown handedness, with nebulous past history who was 

hospitalized for an altered mental state and stage IV decubitus 

ulcer on his sacrum.  He was found to be significantly hypernatremic, 

nonverbal and quadriparetic when he came in.

2. On neurological examination, at this time, he is awake, 

but not alert, severely aphasic and mute, has a spastic 

quadriparesis with significant gegenhalten, has globally absent 

deep tendon reflexes and is unable to stand and walk.

3. CT scan of the brain without contrast reveals atrophy, 

deep white matter disease, and left external capsule lacune, 

but no acute pathology.

4. Laboratory data revealed that on admission, his hemoglobin 

was low at 13.1 G.  His chemistry panel revealed a sodium 

elevated at 157, chloride elevated to 120, BUN elevated at 39, 

with a creatinine of 1.3. Low glucose at 60.  His B12 level was 

>2000.  Folate level was normal at 13.9.  TSH was elevated

at 5.41.  His urinalysis reveals 2+ leukocyte esterase, 15-20 

red blood cells and 5-10 white blood cells per high-power field.

5. The patient's history, neurological examination, laboratory 

data, and imaging studies are most compatible with underlying 

chronic debility due to reasons that are unknown to us and 

now a super added toxic metabolic encephalopathy related 

to significant and severe dehydration and in addition, an 

acute infectious process.



RECOMMENDATIONS:

1. Agree with management thus far.

2. The patient's family has made the decision to stop any further

treatment and aimed for comfort care which is reasonable in this 

case.

3. No further neurological intervention will be ordered at this 

point in time, because of the changing of his level of care to 

comfort care only.

 

Thank you for entrusting me with the care of Mr. Summers.  

Please do let me know if I can be of any further help.





________________________

Jose Bennett M.D., M.S.P.H.



DR:  Mari

D:  11/19/2018 21:14

T:  11/19/2018 22:46

JOB#:  [id number]
MTDD

## 2018-11-19 NOTE — NEPHROLOGY PROGRESS NOTE
Assessment/Plan


Problem List:  


(1) Dehydration


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


(3) CVA (cerebral vascular accident)


(4) Hypernatremia


(5) Urinary retention


Assessment





Altered mental status


CVA (cerebral vascular accident)


Hypernatremia


Dehydration / Urinary retention


Dysphagia


Decubitus ulcer of sacral region, stage 4


Plan





Plan:


 


D5W


Monitor renal parameters


Skin care


brothers





Subjective


ROS Limited/Unobtainable:  No


Constitutional:  Reports: malaise





Objective


Objective





Last 24 Hour Vital Signs








  Date Time  Temp Pulse Resp B/P (MAP) Pulse Ox O2 Delivery O2 Flow Rate FiO2


 


11/19/18 09:00      Room Air  


 


11/19/18 08:00 97.0 64 21 110/77 (88) 98   


 


11/19/18 08:00  62      


 


11/19/18 04:00 96.7 63 18 102/67 (79) 97   


 


11/19/18 03:54  62      


 


11/18/18 23:20  62      


 


11/18/18 21:00      Room Air  


 


11/18/18 20:00 97.2 79 20 110/60 (77) 91   


 


11/18/18 19:12  61      


 


11/18/18 16:00  63 18 100/49 (66) 95   


 


11/18/18 16:00  61      


 


11/18/18 12:00  67      


 


11/18/18 12:00  67 18 99/47 (64) 94   

















Intake and Output  


 


 11/18/18 11/19/18





 19:00 07:00


 


Intake Total  1170 ml


 


Output Total 350 ml 300 ml


 


Balance -350 ml 870 ml


 


  


 


Intake Free Water  30 ml


 


IV Total  1100 ml


 


Tube Feeding  40 ml


 


Output Urine Total 350 ml 300 ml








Laboratory Tests


11/19/18 06:25: 


White Blood Count 5.7, Red Blood Count 3.93L, Hemoglobin 11.1L, Hematocrit 32.5L

, Mean Corpuscular Volume 83, Mean Corpuscular Hemoglobin 28.2, Mean 

Corpuscular Hemoglobin Concent 34.1, Red Cell Distribution Width 12.4, Platelet 

Count 58L, Mean Platelet Volume 12.3H, Neutrophils (%) (Auto) , Lymphocytes (%) 

(Auto) , Monocytes (%) (Auto) , Eosinophils (%) (Auto) , Basophils (%) (Auto) , 

Differential Total Cells Counted 100, Neutrophils % (Manual) 79H, Lymphocytes % 

(Manual) 8L, Monocytes % (Manual) 2, Eosinophils % (Manual) 0, Basophils % (

Manual) 0, Band Neutrophils 11H, Platelet Estimate DecreasedL, Platelet 

Morphology Normal, Sodium Level 147H, Potassium Level 3.6, Chloride Level 111H, 

Carbon Dioxide Level 27, Anion Gap 9, Blood Urea Nitrogen 31H, Creatinine 1.3, 

Estimat Glomerular Filtration Rate , Glucose Level 62L, Uric Acid 5.6, Calcium 

Level 8.6, Phosphorus Level 2.8, Magnesium Level 2.0, Total Bilirubin 0.5, 

Aspartate Amino Transf (AST/SGOT) 80H, Alanine Aminotransferase (ALT/SGPT) 72, 

Alkaline Phosphatase 51, Total Protein 5.7L, Albumin 2.7L, Globulin 3.0, Albumin

/Globulin Ratio 0.9L


Height (Feet):  5


Height (Inches):  7.00


Weight (Pounds):  100


General Appearance:  no apparent distress


Cardiovascular:  normal rate


Respiratory/Chest:  decreased breath sounds


Abdomen:  soft


Objective


no change











Chris Elmore MD Nov 19, 2018 11:20

## 2018-11-19 NOTE — CONSULTATION
Consult Note


Consult Note


NEUROLOGY CONSULTATION:





Full note dictated #[id number]





78 y/o, BM of ?H.


He was hospitalized on 11/16/18 for AMS and a stage


IV sacral decubitus.





He was significantly hypernatremic, non verbal and quadriparetic 


when he came in.





ON EXAM:


Awake but not alert.


Aphasic and mute.


Spasic quadriparesis.


Globally absent DTRs with flexor plantars.





CT of brain with atrophy, DWM disease and left external capsule lacune.





IMPRESSION:


Suspect chronic debility due to reasons unknown and now a superimposed toxic/

metabolic encephalopathy.





REC:


Decision made by family for comfort care only.


Agree with comfort care





Jose Bennett M.D., M.S.P.H.











Jose Bennett MD Nov 19, 2018 21:12

## 2018-11-19 NOTE — GENERAL PROGRESS NOTE
Assessment/Plan


Problem List:  


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


(2) Anxiety


ICD Codes:  F41.9 - Anxiety disorder, unspecified


SNOMED:  78091430


Status:  unchanged


Assessment/Plan


ativan prn


seroquel prn


raise the head to prevent aspiration


the pt was administer ativan and the pt became hypotensive





Subjective


Date patient seen:  Nov 19, 2018


Neurologic/Psychiatric:  Reports: anxiety


Allergies:  


Coded Allergies:  


     No Known Allergies (Unverified , 11/16/18)


Subjective


The pt





Objective





Last 24 Hour Vital Signs








  Date Time  Temp Pulse Resp B/P (MAP) Pulse Ox O2 Delivery O2 Flow Rate FiO2


 


11/19/18 09:00      Room Air  


 


11/19/18 08:00 97.0 64 21 110/77 (88) 98   


 


11/19/18 08:00  62      


 


11/19/18 04:00 96.7 63 18 102/67 (79) 97   


 


11/19/18 03:54  62      


 


11/18/18 23:20  62      


 


11/18/18 21:00      Room Air  


 


11/18/18 20:00 97.2 79 20 110/60 (77) 91   


 


11/18/18 19:12  61      


 


11/18/18 16:00  63 18 100/49 (66) 95   


 


11/18/18 16:00  61      


 


11/18/18 12:00  67      


 


11/18/18 12:00  67 18 99/47 (64) 94   

















Intake and Output  


 


 11/18/18 11/19/18





 19:00 07:00


 


Intake Total  1170 ml


 


Output Total 350 ml 300 ml


 


Balance -350 ml 870 ml


 


  


 


Intake Free Water  30 ml


 


IV Total  1100 ml


 


Tube Feeding  40 ml


 


Output Urine Total 350 ml 300 ml








Laboratory Tests


11/19/18 06:25: 


White Blood Count 5.7, Red Blood Count 3.93L, Hemoglobin 11.1L, Hematocrit 32.5L

, Mean Corpuscular Volume 83, Mean Corpuscular Hemoglobin 28.2, Mean 

Corpuscular Hemoglobin Concent 34.1, Red Cell Distribution Width 12.4, Platelet 

Count 58L, Mean Platelet Volume 12.3H, Neutrophils (%) (Auto) , Lymphocytes (%) 

(Auto) , Monocytes (%) (Auto) , Eosinophils (%) (Auto) , Basophils (%) (Auto) , 

Differential Total Cells Counted 100, Neutrophils % (Manual) 79H, Lymphocytes % 

(Manual) 8L, Monocytes % (Manual) 2, Eosinophils % (Manual) 0, Basophils % (

Manual) 0, Band Neutrophils 11H, Platelet Estimate DecreasedL, Platelet 

Morphology Normal, Sodium Level 147H, Potassium Level 3.6, Chloride Level 111H, 

Carbon Dioxide Level 27, Anion Gap 9, Blood Urea Nitrogen 31H, Creatinine 1.3, 

Estimat Glomerular Filtration Rate , Glucose Level 62L, Uric Acid 5.6, Calcium 

Level 8.6, Phosphorus Level 2.8, Magnesium Level 2.0, Total Bilirubin 0.5, 

Aspartate Amino Transf (AST/SGOT) 80H, Alanine Aminotransferase (ALT/SGPT) 72, 

Alkaline Phosphatase 51, Total Protein 5.7L, Albumin 2.7L, Globulin 3.0, Albumin

/Globulin Ratio 0.9L


Height (Feet):  5


Height (Inches):  7.00


Weight (Pounds):  100


General Appearance:  alert, lethargic - waxing and waning , confused


Neurologic:  disoriented, depressed affect











Alex Love MD Nov 19, 2018 11:44

## 2018-11-19 NOTE — DIAGNOSTIC IMAGING REPORT
Indication: Post nasogastric tube placement

 

Technique: Supine view of the upper abdomen

 

Comparison: none

 

Findings: There is a nasogastric tube, tip projected at the level of the gastric

body, proximal port well beyond the gastroesophageal junction. There is a Stewart

catheter in place. Considerable gas is seen in nondilated colon

 

Impression: Satisfactory nasogastric intubation

 

Other findings as noted

 

This agrees with the preliminary interpretation provided overnight by [facility]

teleradiology service.

## 2018-11-19 NOTE — CARDIOLOGY PROGRESS NOTE
Assessment/Plan


Assessment/Plan


1. Hypotension, resolved, serum sodium better following D5W infusion.


2. Dysphagia, prior history of CVA.  CT of head did not show any acute changes, 

continue aspirin and atorvastatin.


3. Prerenal azotemia/acute kidney injury, resolving.


4. Anemia with thrombocytopenia.


5. Failure to thrive.


6. Metabolic encephalopathy on comfort care.





Subjective


Subjective


Sinus rhythm at rate of 63.





Objective





Last 24 Hour Vital Signs








  Date Time  Temp Pulse Resp B/P (MAP) Pulse Ox O2 Delivery O2 Flow Rate FiO2


 


11/19/18 21:00      Room Air  


 


11/19/18 20:00 96.8 63 18 100/70 (80) 96   


 


11/19/18 19:03  59      


 


11/19/18 16:00  58      


 


11/19/18 16:00 97.0 62 19 119/83 (95) 96   


 


11/19/18 12:00 97.0 62 19 112/78 (89) 96   


 


11/19/18 12:00  58      


 


11/19/18 09:00      Room Air  


 


11/19/18 08:00 97.0 64 21 110/77 (88) 98   


 


11/19/18 08:00  62      


 


11/19/18 04:00 96.7 63 18 102/67 (79) 97   


 


11/19/18 03:54  62      


 


11/18/18 23:20  62      

















Intake and Output  


 


 11/18/18 11/19/18





 19:00 07:00


 


Intake Total  1170 ml


 


Output Total 350 ml 300 ml


 


Balance -350 ml 870 ml


 


  


 


Intake Free Water  30 ml


 


IV Total  1100 ml


 


Tube Feeding  40 ml


 


Output Urine Total 350 ml 300 ml











Laboratory Tests








Test


  11/19/18


06:25


 


White Blood Count


  5.7 K/UL


(4.8-10.8)


 


Red Blood Count


  3.93 M/UL


(4.70-6.10)  L


 


Hemoglobin


  11.1 G/DL


(14.2-18.0)  L


 


Hematocrit


  32.5 %


(42.0-52.0)  L


 


Mean Corpuscular Volume 83 FL (80-99)  


 


Mean Corpuscular Hemoglobin


  28.2 PG


(27.0-31.0)


 


Mean Corpuscular Hemoglobin


Concent 34.1 G/DL


(32.0-36.0)


 


Red Cell Distribution Width


  12.4 %


(11.6-14.8)


 


Platelet Count


  58 K/UL


(150-450)  L


 


Mean Platelet Volume


  12.3 FL


(6.5-10.1)  H


 


Neutrophils (%) (Auto)


  % (45.0-75.0)


 


 


Lymphocytes (%) (Auto)


  % (20.0-45.0)


 


 


Monocytes (%) (Auto)  % (1.0-10.0)  


 


Eosinophils (%) (Auto)  % (0.0-3.0)  


 


Basophils (%) (Auto)  % (0.0-2.0)  


 


Differential Total Cells


Counted 100  


 


 


Neutrophils % (Manual) 79 % (45-75)  H


 


Lymphocytes % (Manual) 8 % (20-45)  L


 


Monocytes % (Manual) 2 % (1-10)  


 


Eosinophils % (Manual) 0 % (0-3)  


 


Basophils % (Manual) 0 % (0-2)  


 


Band Neutrophils 11 % (0-8)  H


 


Platelet Estimate Decreased  L


 


Platelet Morphology Normal  


 


Sodium Level


  147 MMOL/L


(136-145)  H


 


Potassium Level


  3.6 MMOL/L


(3.5-5.1)


 


Chloride Level


  111 MMOL/L


()  H


 


Carbon Dioxide Level


  27 MMOL/L


(21-32)


 


Anion Gap


  9 mmol/L


(5-15)


 


Blood Urea Nitrogen


  31 mg/dL


(7-18)  H


 


Creatinine


  1.3 MG/DL


(0.55-1.30)


 


Estimat Glomerular Filtration


Rate  mL/min (>60)  


 


 


Glucose Level


  62 MG/DL


()  L


 


Uric Acid


  5.6 MG/DL


(2.6-7.2)


 


Calcium Level


  8.6 MG/DL


(8.5-10.1)


 


Phosphorus Level


  2.8 MG/DL


(2.5-4.9)


 


Magnesium Level


  2.0 MG/DL


(1.8-2.4)


 


Total Bilirubin


  0.5 MG/DL


(0.2-1.0)


 


Aspartate Amino Transf


(AST/SGOT) 80 U/L (15-37)


H


 


Alanine Aminotransferase


(ALT/SGPT) 72 U/L (12-78)


 


 


Alkaline Phosphatase


  51 U/L


()


 


Total Protein


  5.7 G/DL


(6.4-8.2)  L


 


Albumin


  2.7 G/DL


(3.4-5.0)  L


 


Globulin 3.0 g/dL  


 


Albumin/Globulin Ratio


  0.9 (1.0-2.7)


L











Microbiology








 Date/Time


Source Procedure


Growth Status


 


 


 11/17/18 17:30


Indwelling Cath Urine Culture - Preliminary


NO GROWTH AFTER 24 HOURS Resulted








Objective


HEENT:  Atraumatic and normocephalic.  Anicteric.  Pupils are equal, round,


and reactive to light and accommodation.  Extraocular muscles intact.


NECK:  JVP is less than 5 cm.  No carotid bruit.  Carotid upstrokes 2+


bilaterally.


CARDIOVASCULAR:  Normal S1, S2.  Regular rate and rhythm.  No murmurs,


gallops, or rubs.  PMI is at fourth intercostal space at the midclavicular


line.


LUNGS:  Clear to auscultation bilaterally.


ABDOMEN:  Soft, nontender, and nondistended.  No hepatosplenomegaly.


Positive bowel sounds.


EXTREMITIES:  No evidence of edema, clubbing, or cyanosis.











Victor M Gallardo MD Nov 19, 2018 23:18

## 2018-11-19 NOTE — DIAGNOSTIC IMAGING REPORT
Indication: Abnormal renal function tests, back pain

 

Technique: Gray-scale and duplex images of the upper abdomen were obtained. Doppler

interrogation of the hepatic and pancreatic vessels

 

Comparison:

 

Findings: Exam somewhat limited, due to limited patient indication.   Gallbladder is

unremarkable, without stones, wall thickening, nor pericholecystic fluid. 

Sonographic Sarmiento's sign could not be ascertained, patient noncommunicative.  Common

bile duct measures 5 mm in diameter.  No intrahepatic biliary ductal dilatation. 

Liver demonstrates normal echogenicity, no focal abnormality. No surface nodularity

to suggest cirrhosis   Portal vein and hepatic veins are patent.   Pancreas is

unremarkable.    Spleen is unremarkable.  Left kidney measures 10 cm in length. 

Right kidney measures 9 point cm length.  Both kidneys demonstrate normal

echogenicity.  There is mild right hydronephrosis   No focal abnormality   . The

urinary bladder contains a Stewart catheter. There is approximately 40 mL of urine

within the bladder despite this. Non-aneurysmal abdominal aorta .  There is ascites

fluid. There are bilateral pleural effusions

 

Impression: Mild right hydronephrosis, etiology not demonstrated

 

Negative for gallstones or dilated ducts

 

No evidence of hepatic cirrhosis

 

Ascites

 

Bilateral pleural effusions

 

Stewart catheter. 40 mL retained urine despite this

## 2018-11-19 NOTE — GENERAL PROGRESS NOTE
Assessment/Plan


Status:  stable


Assessment/Plan


# Thrombocytopenia - potential causes multifactorial, has lactic acidosis, with 

elevated lactate, on abx, evaluate liver and viral etiologies to begin. Also 

could be related to meds pt has received,


--> Currently, plt remains low 


--> Cont tot monitor for improvement 


--> peripheral smear reviewed, no blasts 


--> Hep panel negative,HIV negative  


--> US abd to evaluate for cirrhosis and hsm ordered - pending 


--> Meds have been reviewed


--> Ok for ppx if Plt count <20k abd fever, or if plt <10k without fever 


# Anemia of chronic disease due to underlying chronic medical issues, 

multifactorial. 


--> Anemia w/u has been reviewed. Ferritin at 594


--> Will trend cbc daily


--> Hgb goal >7. Transfuse prn. 


--> No evidence of hemolysis, peripheral smear has been reviewed 


# Sacral decub ulcer. Surgery is following, appreciate recs. 


--> Resolving stage 4 full thickness sacral decubitus ulcer.  Seems to have had 

prior debridement and care.  Seems to be healing well over time.  Chronic.  no 

odor.  no significant drainage.  no signs of infection.  wound bed clean with 

granulation tissue.  3x4.  karen-wound clean.  


--> Wash sacral decubitus ulcer daily with NS, apply hydrogel, pack with gauze, 

apply foam dressing daily and prn


# CVA.


# Dehydration. IVF 


# Dysphagia. 








GREATLY APPRECIATE CONSULTATION.





Subjective


Date patient seen:  Nov 19, 2018


Hematologic/Lymphatic:  Reports: anemia


Allergies:  


Coded Allergies:  


     No Known Allergies (Unverified , 11/16/18)


All Systems:  reviewed and negative except above


Subjective


Pt in stable condition. No acute events. H/H stable. Pt's son to sign [facility] 

this afternoon.





Objective





Last 24 Hour Vital Signs








  Date Time  Temp Pulse Resp B/P (MAP) Pulse Ox O2 Delivery O2 Flow Rate FiO2


 


11/19/18 09:00      Room Air  


 


11/19/18 08:00 97.0 64 21 110/77 (88) 98   


 


11/19/18 08:00  62      


 


11/19/18 04:00 96.7 63 18 102/67 (79) 97   


 


11/19/18 03:54  62      


 


11/18/18 23:20  62      


 


11/18/18 21:00      Room Air  


 


11/18/18 20:00 97.2 79 20 110/60 (77) 91   


 


11/18/18 19:12  61      


 


11/18/18 16:00  63 18 100/49 (66) 95   


 


11/18/18 16:00  61      


 


11/18/18 12:00  67      


 


11/18/18 12:00  67 18 99/47 (64) 94   

















Intake and Output  


 


 11/18/18 11/19/18





 19:00 07:00


 


Intake Total  1170 ml


 


Output Total 350 ml 300 ml


 


Balance -350 ml 870 ml


 


  


 


Intake Free Water  30 ml


 


IV Total  1100 ml


 


Tube Feeding  40 ml


 


Output Urine Total 350 ml 300 ml








Laboratory Tests


11/19/18 06:25: 


White Blood Count 5.7, Red Blood Count 3.93L, Hemoglobin 11.1L, Hematocrit 32.5L

, Mean Corpuscular Volume 83, Mean Corpuscular Hemoglobin 28.2, Mean 

Corpuscular Hemoglobin Concent 34.1, Red Cell Distribution Width 12.4, Platelet 

Count 58L, Mean Platelet Volume 12.3H, Neutrophils (%) (Auto) , Lymphocytes (%) 

(Auto) , Monocytes (%) (Auto) , Eosinophils (%) (Auto) , Basophils (%) (Auto) , 

Differential Total Cells Counted 100, Neutrophils % (Manual) 79H, Lymphocytes % 

(Manual) 8L, Monocytes % (Manual) 2, Eosinophils % (Manual) 0, Basophils % (

Manual) 0, Band Neutrophils 11H, Platelet Estimate DecreasedL, Platelet 

Morphology Normal, Sodium Level 147H, Potassium Level 3.6, Chloride Level 111H, 

Carbon Dioxide Level 27, Anion Gap 9, Blood Urea Nitrogen 31H, Creatinine 1.3, 

Estimat Glomerular Filtration Rate , Glucose Level 62L, Uric Acid 5.6, Calcium 

Level 8.6, Phosphorus Level 2.8, Magnesium Level 2.0, Total Bilirubin 0.5, 

Aspartate Amino Transf (AST/SGOT) 80H, Alanine Aminotransferase (ALT/SGPT) 72, 

Alkaline Phosphatase 51, Total Protein 5.7L, Albumin 2.7L, Globulin 3.0, Albumin

/Globulin Ratio 0.9L


Height (Feet):  5


Height (Inches):  7.00


Weight (Pounds):  100


Objective


General Appearance:  no apparent distress


Lines, tubes and drains:  peripheral


HEENT:  mucous membranes moist


Neck:  normal inspection


Respiratory/Chest:  normal breath sounds, no respiratory distress


Cardiovascular/Chest:  normal rate


Abdomen:  soft, no organomegaly, no mass


Extremities:  non-tender, other


Skin Exam:  other


Neurologic:  alert











Ran Álvarez MD Nov 19, 2018 12:03

## 2018-11-20 VITALS — SYSTOLIC BLOOD PRESSURE: 112 MMHG | DIASTOLIC BLOOD PRESSURE: 76 MMHG

## 2018-11-20 VITALS — DIASTOLIC BLOOD PRESSURE: 43 MMHG | SYSTOLIC BLOOD PRESSURE: 106 MMHG

## 2018-11-20 VITALS — DIASTOLIC BLOOD PRESSURE: 78 MMHG | SYSTOLIC BLOOD PRESSURE: 109 MMHG

## 2018-11-20 VITALS — DIASTOLIC BLOOD PRESSURE: 71 MMHG | SYSTOLIC BLOOD PRESSURE: 110 MMHG

## 2018-11-20 VITALS — SYSTOLIC BLOOD PRESSURE: 120 MMHG | DIASTOLIC BLOOD PRESSURE: 75 MMHG

## 2018-11-20 VITALS — SYSTOLIC BLOOD PRESSURE: 95 MMHG | DIASTOLIC BLOOD PRESSURE: 64 MMHG

## 2018-11-20 RX ADMIN — ATORVASTATIN CALCIUM SCH MG: 20 TABLET, FILM COATED ORAL at 20:53

## 2018-11-20 RX ADMIN — LEVOFLOXACIN SCH MLS/HR: 5 INJECTION, SOLUTION INTRAVENOUS at 15:42

## 2018-11-20 RX ADMIN — PANTOPRAZOLE SODIUM SCH MG: 40 INJECTION, POWDER, FOR SOLUTION INTRAVENOUS at 08:16

## 2018-11-20 RX ADMIN — ASPIRIN SCH MG: 81 TABLET, DELAYED RELEASE ORAL at 08:16

## 2018-11-20 NOTE — CARDIOLOGY REPORT
--------------- APPROVED REPORT --------------





EXAM: Two-dimensional and M-mode echocardiogram with Doppler and color 

Doppler.



INDICATION

CVA



M-Mode DIMENSIONS 

IVSd0.8 (0.7-1.1cm)Left Atrium (MM)3.2 (1.6-4.0cm)

LVDd4.1 (3.5-5.6cm)Aortic Root3.1 (2.0-3.7cm)

PWd1.0 (0.7-1.1cm)Aortic Cusp Exc.1.8 (1.5-2.0cm)

IVSs0.8 cm

LVDs2.6 (2.5-4.0cm)

PWs1.0 cm





Technically difficult study due to pts resistance .

Normal left ventricular chamber size, systolic function and wall motion.

Left ventricular ejection fraction estimated to be  55 %.

No evidence of left  ventricular hypertrophy .

No evidence of pericardial effusion. 

All other cardiac chamber sizes are within normal limits. 

Focal aortic valve sclerosis with adequate cusp excursion.

Thickened mitral valve leaflets with normal excursion.

Mitral annulus and aortic root calcification.

Normal pulmonic valve structure. 

Normal tricuspid valve structure. 

IVC at normal size with physiologic collapse.



A  color flow and spectral Doppler study was performed and revealed:

No aortic regurgitation..

Moderate mitral regurgitation.

Mitral diastolic velocities suggest reduced left ventricular relaxation c/w mild LV 

diastolic dysfunction (Grade I ). 

Moderate tricuspid regurgitation.

Tricuspid  systolic velocities suggests peak right ventricular systolic pressure of  40  

mmHg,consistent with mild pulmonary hypertension.

No Pulmonic regurgitation present.

## 2018-11-20 NOTE — PROGRESS NOTE
DATE:  11/19/2018

SUBJECTIVE:  This is an elderly male, currently nonverbal and bedbound, and

he is progressively deteriorated.  He is not eating any food.  He failed

the swallowing evaluation.  Family does not want to have PEG tube or NG

tube.  He is requesting for ______ and nursing home placement.  The

patient is currently nonverbal, opens his eyes, looks comfortable.



PHYSICAL EXAMINATION:

VITAL SIGNS:  Blood pressure is 112/78, pulse 58, no fever.

CHEST:  Bilaterally clear.

CARDIOVASCULAR:  Regular rhythm.  No gallop.  No murmur.

ABDOMEN:  Soft.

EXTREMITIES:  CCE.



LABORATORY AND DIAGNOSTIC DATA:  CBC is unremarkable.  Sodium 147,

potassium 3.6, BUN 31, creatinine 1.3.



ASSESSMENT:

1. Altered mental status.

2. Dehydration.

3. Parkinson disease.

4. Decubitus.



PLAN:

1. The patient's family requested DNR/DNI, hospice care.

2. Discharge plan to SNF and ______ hospitalist was called, discussed

with .

3. No IV fluids and no p.o. feeding.









  ______________________________________________

  Todd Spear M.D. DR:  Sae

D:  11/19/2018 15:13

T:  11/20/2018 04:27

JOB#:  [id number]

CC:

## 2018-11-20 NOTE — GENERAL PROGRESS NOTE
Assessment/Plan


Problem List:  


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


(2) Anxiety


ICD Codes:  F41.9 - Anxiety disorder, unspecified


SNOMED:  [id number]


Status:  unchanged


Assessment/Plan


ativan prn


seroquel prn


raise the head to prevent aspiration


the pt was administer Ativan and the pt became hypotensive





Subjective


Date patient seen:  Nov 20, 2018


Neurologic/Psychiatric:  Reports: anxiety


Allergies:  


Coded Allergies:  


     No Known Allergies (Unverified , 11/16/18)


Subjective


The pt has waxing and waning of consciousness. he has episodes of agitation





Objective





Last 24 Hour Vital Signs








  Date Time  Temp Pulse Resp B/P (MAP) Pulse Ox O2 Delivery O2 Flow Rate FiO2


 


11/20/18 09:00      Room Air  


 


11/20/18 08:00 97.2 59 21 106/43 (64) 100   


 


11/20/18 08:00  56      


 


11/20/18 04:00 97.2 61 16 110/71 (84) 99   


 


11/20/18 03:21  56      


 


11/20/18 00:00 97.1 58 20 112/76 (88) 98   


 


11/19/18 23:27  57      


 


11/19/18 21:00      Room Air  


 


11/19/18 20:00 96.8 63 18 100/70 (80) 96   


 


11/19/18 19:03  59      


 


11/19/18 16:00  58      


 


11/19/18 16:00 97.0 62 19 119/83 (95) 96   


 


11/19/18 12:00 97.0 62 19 112/78 (89) 96   


 


11/19/18 12:00  58      

















Intake and Output  


 


 11/19/18 11/20/18





 18:59 06:59


 


Intake Total 800 ml 


 


Output Total 800 ml 403 ml


 


Balance 0 ml -403 ml


 


  


 


IV Total 800 ml 


 


Output Urine Total 800 ml 400 ml


 


Stool Total  3 ml


 


# Bowel Movements  1








Height (Feet):  5


Height (Inches):  7.00


Weight (Pounds):  100


General Appearance:  no apparent distress, alert, confused, agitated











Alex Love MD Nov 20, 2018 10:45

## 2018-11-20 NOTE — NEPHROLOGY PROGRESS NOTE
Assessment/Plan


Problem List:  


(1) Dehydration


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


(3) CVA (cerebral vascular accident)


(4) Hypernatremia


(5) Urinary retention


Assessment





Altered mental status


CVA (cerebral vascular accident)


Hypernatremia


Dehydration / Urinary retention


Dysphagia


Decubitus ulcer of sacral region, stage 4


Plan





Plan:


 


D5W


Monitor renal parameters


Skin care


brothers





Subjective


ROS Limited/Unobtainable:  No





Objective


Objective





Last 24 Hour Vital Signs








  Date Time  Temp Pulse Resp B/P (MAP) Pulse Ox O2 Delivery O2 Flow Rate FiO2


 


11/20/18 04:00 97.2 61 16 110/71 (84) 99   


 


11/20/18 03:21  56      


 


11/20/18 00:00 97.1 58 20 112/76 (88) 98   


 


11/19/18 23:27  57      


 


11/19/18 21:00      Room Air  


 


11/19/18 20:00 96.8 63 18 100/70 (80) 96   


 


11/19/18 19:03  59      


 


11/19/18 16:00  58      


 


11/19/18 16:00 97.0 62 19 119/83 (95) 96   


 


11/19/18 12:00 97.0 62 19 112/78 (89) 96   


 


11/19/18 12:00  58      

















Intake and Output  


 


 11/19/18 11/20/18





 18:59 06:59


 


Intake Total 800 ml 


 


Output Total 800 ml 403 ml


 


Balance 0 ml -403 ml


 


  


 


IV Total 800 ml 


 


Output Urine Total 800 ml 400 ml


 


Stool Total  3 ml


 


# Bowel Movements  1








Height (Feet):  5


Height (Inches):  7.00


Weight (Pounds):  100


General Appearance:  no apparent distress


EENT:  other - NGT +


Respiratory/Chest:  decreased breath sounds


Abdomen:  soft


Objective


no change











Chris Elmore MD Nov 20, 2018 09:23

## 2018-11-20 NOTE — CARDIOLOGY REPORT
--------------- APPROVED REPORT --------------





EKG Measurement

Heart [id number]

TN [id number]

[id number]

[id number]

[id number]





Normal sinus rhythm

Left ventricular hypertrophy with QRS widening

Prolonged QT

Abnormal ECG

## 2018-11-20 NOTE — CARDIOLOGY REPORT
--------------- APPROVED REPORT --------------





EKG Measurement

Heart 

[id number]

[id number]

[id number]

[id number]





Atrial flutter with variable AV block with premature ventricular or aberrantly conducted 

complexes

Nonspecific ST and T wave abnormality

Abnormal ECG

## 2018-11-20 NOTE — CARDIOLOGY PROGRESS NOTE
Assessment/Plan


Assessment/Plan


1. Hypotension, resolved, continue hydration, BMP in am.


2. Dysphagia, prior history of CVA, continue aspirin and atorvastatin.


3. Prerenal azotemia/acute kidney injury, resolving.


4. Anemia with thrombocytopenia.


5. Failure to thrive.


6. Metabolic encephalopathy on comfort care.





Subjective


Subjective


Sinus rhythm at rate of 65.





Objective





Last 24 Hour Vital Signs








  Date Time  Temp Pulse Resp B/P (MAP) Pulse Ox O2 Delivery O2 Flow Rate FiO2


 


11/20/18 21:00      Room Air  


 


11/20/18 20:00 97.7 65 16 120/75 (90) 94   


 


11/20/18 20:00  65      


 


11/20/18 16:00 95.4 77 18 109/78 (88) 96   


 


11/20/18 16:00  64      


 


11/20/18 12:00 92.4 58 21 95/64 (74) 95   


 


11/20/18 12:00  55      


 


11/20/18 09:00      Room Air  


 


11/20/18 08:00  59 21 106/43 (64) 100   


 


11/20/18 08:00  56      


 


11/20/18 04:00 97.2 61 16 110/71 (84) 99   


 


11/20/18 03:21  56      


 


11/20/18 00:00 97.1 58 20 112/76 (88) 98   


 


11/19/18 23:27  57      

















Intake and Output  


 


 11/19/18 11/20/18





 18:59 06:59


 


Intake Total 800 ml 


 


Output Total 800 ml 403 ml


 


Balance 0 ml -403 ml


 


  


 


IV Total 800 ml 


 


Output Urine Total 800 ml 400 ml


 


Stool Total  3 ml


 


# Bowel Movements  1








Objective


HEENT:  Atraumatic and normocephalic.  Anicteric.  Pupils are equal, round,


and reactive to light and accommodation.  Extraocular muscles intact.


NECK:  JVP is less than 5 cm.  No carotid bruit.  Carotid upstrokes 2+


bilaterally.


CARDIOVASCULAR:  Normal S1, S2.  Regular rate and rhythm.  No murmurs,


gallops, or rubs.  PMI is at fourth intercostal space at the midclavicular


line.


LUNGS:  Clear to auscultation bilaterally.


ABDOMEN:  Soft, nontender, and nondistended.  No hepatosplenomegaly.


Positive bowel sounds.


EXTREMITIES:  No evidence of edema, clubbing, or cyanosis.











Victor M Gallardo MD Nov 20, 2018 22:50

## 2018-11-21 VITALS — DIASTOLIC BLOOD PRESSURE: 54 MMHG | SYSTOLIC BLOOD PRESSURE: 89 MMHG

## 2018-11-21 VITALS — DIASTOLIC BLOOD PRESSURE: 62 MMHG | SYSTOLIC BLOOD PRESSURE: 98 MMHG

## 2018-11-21 VITALS — DIASTOLIC BLOOD PRESSURE: 61 MMHG | SYSTOLIC BLOOD PRESSURE: 99 MMHG

## 2018-11-21 VITALS — SYSTOLIC BLOOD PRESSURE: 104 MMHG | DIASTOLIC BLOOD PRESSURE: 63 MMHG

## 2018-11-21 LAB
ADD MANUAL DIFF: YES
ALBUMIN SERPL-MCNC: 2.3 G/DL (ref 3.4–5)
ALBUMIN/GLOB SERPL: 0.7 {RATIO} (ref 1–2.7)
ALP SERPL-CCNC: 71 U/L (ref 46–116)
ALT SERPL-CCNC: 82 U/L (ref 12–78)
ANION GAP SERPL CALC-SCNC: 6 MMOL/L (ref 5–15)
AST SERPL-CCNC: 67 U/L (ref 15–37)
BILIRUB SERPL-MCNC: 0.6 MG/DL (ref 0.2–1)
BUN SERPL-MCNC: 34 MG/DL (ref 7–18)
CALCIUM SERPL-MCNC: 8.7 MG/DL (ref 8.5–10.1)
CHLORIDE SERPL-SCNC: 109 MMOL/L (ref 98–107)
CO2 SERPL-SCNC: 28 MMOL/L (ref 21–32)
CREAT SERPL-MCNC: 1.4 MG/DL (ref 0.55–1.3)
ERYTHROCYTE [DISTWIDTH] IN BLOOD BY AUTOMATED COUNT: 12.3 % (ref 11.6–14.8)
GLOBULIN SER-MCNC: 3.4 G/DL
HCT VFR BLD CALC: 32.4 % (ref 42–52)
HGB BLD-MCNC: 10.9 G/DL (ref 14.2–18)
MCV RBC AUTO: 82 FL (ref 80–99)
PHOSPHATE SERPL-MCNC: 2.6 MG/DL (ref 2.5–4.9)
PLATELET # BLD: 49 K/UL (ref 150–450)
POTASSIUM SERPL-SCNC: 3 MMOL/L (ref 3.5–5.1)
RBC # BLD AUTO: 3.93 M/UL (ref 4.7–6.1)
SODIUM SERPL-SCNC: 143 MMOL/L (ref 136–145)
WBC # BLD AUTO: 4.7 K/UL (ref 4.8–10.8)

## 2018-11-21 NOTE — PROGRESS NOTE
DATE:  11/21/2018

SUBJECTIVE:  This is a 77-year-old male, who is currently in bed,

nonverbal, tolerating NG tube feeding.  His MRI is negative.  The patient

is currently nonverbal and bedbound.



PHYSICAL EXAMINATION:

VITAL SIGNS:  Blood pressure is _____ asymptomatic.

CHEST:  Bilaterally clear.

CARDIOVASCULAR:  Regular rhythm.

ABDOMEN:  Soft.

EXTREMITIES:  No CCE.

SKIN:  Sacral decubitus stage III.



ASSESSMENT:

1. Altered mental status.

2. Parkinson disease, getting worse.

3. Encephalopathy.

4. Dysphagia.



Left a message yesterday, the son, he want to do MRI.  MRI is negative.

The patient probably can go back to the nursing home for wound care, NG

feedings, speech therapy and swallow evaluation.  Continue antibiotics for

three days.  Continue Pepcid. Continue supportive treatment and probably

hospice care.  If I agree, son is agree with the hospice care.









  ______________________________________________

  Todd Spear M.D.





DR:  Sae

D:  11/21/2018 14:06

T:  11/21/2018 19:12

JOB#:  [id number]

CC:

## 2018-11-21 NOTE — CARDIOLOGY PROGRESS NOTE
Assessment/Plan


Assessment/Plan


1. Hypotension, continue hydration,.


2. Dysphagia, prior history of CVA, continue aspirin and atorvastatin.


3. Prerenal azotemia/acute kidney injury, resolving.


4. Anemia with thrombocytopenia.


5. Failure to thrive.


6. Metabolic encephalopathy on comfort care.





Subjective


Subjective


Transferred to the med-surg unit.


No cardiac events.





Objective





Last 24 Hour Vital Signs








  Date Time  Temp Pulse Resp B/P (MAP) Pulse Ox O2 Delivery O2 Flow Rate FiO2


 


11/21/18 15:48 97.0 62 16 104/63 (77) 100   


 


11/21/18 12:07 97.1 68 16 89/54 (66) 97   


 


11/21/18 09:00      Room Air  


 


11/21/18 08:24 98.6 82 18 98/62 (74) 95   


 


11/21/18 04:00 98.8 78 20 99/61 (74) 99   


 


11/21/18 00:14 95.9       

















Intake and Output  


 


 11/20/18 11/21/18





 19:00 07:00


 


Intake Total 860 ml 200 ml


 


Output Total  800 ml


 


Balance 860 ml -600 ml


 


  


 


Intake Oral 360 ml 


 


Free Water 50 ml 


 


IV Total 450 ml 200 ml


 


Output Urine Total  800 ml


 


# Bowel Movements 1 











Laboratory Tests








Test


  11/21/18


11:10


 


White Blood Count


  4.7 K/UL


(4.8-10.8)  L


 


Red Blood Count


  3.93 M/UL


(4.70-6.10)  L


 


Hemoglobin


  10.9 G/DL


(14.2-18.0)  L


 


Hematocrit


  32.4 %


(42.0-52.0)  L


 


Mean Corpuscular Volume 82 FL (80-99)  


 


Mean Corpuscular Hemoglobin


  27.7 PG


(27.0-31.0)


 


Mean Corpuscular Hemoglobin


Concent 33.5 G/DL


(32.0-36.0)


 


Red Cell Distribution Width


  12.3 %


(11.6-14.8)


 


Platelet Count


  49 K/UL


(150-450)  L


 


Mean Platelet Volume


  11.5 FL


(6.5-10.1)  H


 


Neutrophils (%) (Auto)


  % (45.0-75.0)


 


 


Lymphocytes (%) (Auto)


  % (20.0-45.0)


 


 


Monocytes (%) (Auto)  % (1.0-10.0)  


 


Eosinophils (%) (Auto)  % (0.0-3.0)  


 


Basophils (%) (Auto)  % (0.0-2.0)  


 


Differential Total Cells


Counted 100  


 


 


Neutrophils % (Manual) 71 % (45-75)  


 


Lymphocytes % (Manual) 7 % (20-45)  L


 


Monocytes % (Manual) 8 % (1-10)  


 


Eosinophils % (Manual) 0 % (0-3)  


 


Basophils % (Manual) 0 % (0-2)  


 


Band Neutrophils 14 % (0-8)  H


 


Platelet Estimate Decreased  L


 


Platelet Morphology   


 


Giant Platelets Rare  


 


Hypochromasia 1+  


 


Sodium Level


  143 MMOL/L


(136-145)


 


Potassium Level


  3.0 MMOL/L


(3.5-5.1)  L


 


Chloride Level


  109 MMOL/L


()  H


 


Carbon Dioxide Level


  28 MMOL/L


(21-32)


 


Anion Gap


  6 mmol/L


(5-15)


 


Blood Urea Nitrogen


  34 mg/dL


(7-18)  H


 


Creatinine


  1.4 MG/DL


(0.55-1.30)  H


 


Estimat Glomerular Filtration


Rate  mL/min (>60)  


 


 


Glucose Level


  75 MG/DL


()


 


Calcium Level


  8.7 MG/DL


(8.5-10.1)


 


Phosphorus Level


  2.6 MG/DL


(2.5-4.9)


 


Magnesium Level


  2.0 MG/DL


(1.8-2.4)


 


Total Bilirubin


  0.6 MG/DL


(0.2-1.0)


 


Aspartate Amino Transf


(AST/SGOT) 67 U/L (15-37)


H


 


Alanine Aminotransferase


(ALT/SGPT) 82 U/L (12-78)


H


 


Alkaline Phosphatase


  71 U/L


()


 


Total Protein


  5.7 G/DL


(6.4-8.2)  L


 


Albumin


  2.3 G/DL


(3.4-5.0)  L


 


Globulin 3.4 g/dL  


 


Albumin/Globulin Ratio


  0.7 (1.0-2.7)


L








Objective


HEENT:  Atraumatic and normocephalic.  Anicteric.  Pupils are equal, round,


and reactive to light and accommodation.  Extraocular muscles intact.


NECK:  JVP is less than 5 cm.  No carotid bruit.  Carotid upstrokes 2+


bilaterally.


CARDIOVASCULAR:  Normal S1, S2.  Regular rate and rhythm.  No murmurs,


gallops, or rubs.  PMI is at fourth intercostal space at the midclavicular


line.


LUNGS:  Clear to auscultation bilaterally.


ABDOMEN:  Soft, nontender, and nondistended.  No hepatosplenomegaly.


Positive bowel sounds.


EXTREMITIES:  No evidence of edema, clubbing, or cyanosis.











Victor M Gallardo MD Nov 21, 2018 21:51

## 2018-11-21 NOTE — NEPHROLOGY PROGRESS NOTE
Assessment/Plan


Problem List:  


(1) Dehydration


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


(3) CVA (cerebral vascular accident)


(4) Hypernatremia


(5) Urinary retention


Assessment





Altered mental status


CVA (cerebral vascular accident)


Hypernatremia


Dehydration / Urinary retention


Dysphagia


Decubitus ulcer of sacral region, stage 4


Plan





Plan:


 


D5W- decrease rate


Monitor renal parameters


Skin care


brothers


monitor rlab





Subjective


ROS Limited/Unobtainable:  No





Objective


Objective





Last 24 Hour Vital Signs








  Date Time  Temp Pulse Resp B/P (MAP) Pulse Ox O2 Delivery O2 Flow Rate FiO2


 


11/21/18 08:24 98.6 82 18 98/62 (74) 95   


 


11/21/18 04:00 98.8 78 20 99/61 (74) 99   


 


11/21/18 00:14 95.9       


 


11/20/18 21:00      Room Air  


 


11/20/18 20:00 97.7 65 16 120/75 (90) 94   


 


11/20/18 20:00  65      


 


11/20/18 16:00 95.4 77 18 109/78 (88) 96   


 


11/20/18 16:00  64      


 


11/20/18 12:00 92.4 58 21 95/64 (74) 95   


 


11/20/18 12:00  55      

















Intake and Output  


 


 11/20/18 11/21/18





 18:59 06:59


 


Intake Total 860 ml 100 ml


 


Output Total  800 ml


 


Balance 860 ml -700 ml


 


  


 


Intake Oral 360 ml 


 


Free Water 50 ml 


 


IV Total 450 ml 100 ml


 


Output Urine Total  800 ml


 


# Bowel Movements 1 








Height (Feet):  5


Height (Inches):  7.00


Weight (Pounds):  159


General Appearance:  no apparent distress


EENT:  other - NGt


Objective


no change











Chris Elmore MD Nov 21, 2018 10:29

## 2018-11-21 NOTE — DIAGNOSTIC IMAGING REPORT
Indication: Altered mental status

 

Technique: sagittal T1 fast spin echo, axial T1 FLAIR, axial T2 FLAIR, axial T2 FS

PROPELLER, axial T2* GRE, axial diffusion weighted images. ADC and exponential ADC

maps generated

 

Comparison: Brain CT 11/16/2018

 

Findings: No abnormal areas of restricted diffusion to suggest acute infarction. No

acute hemorrhage or edema. There is periventricular and subcortical deep white matter

high T2 signal foci, most likely on the basis of chronic ischemic change. Some of

this process also corresponds to the external capsular abnormality described on

recent CT scan. No mass effect nor midline shift. There is age-related enlargement of

the ventricles and extra axial CSF spaces. Visualized orbits and sinuses are

unremarkable.. Visualized orbits and sinuses are unremarkable.

 

Impression: Chronic volume loss and T2 hyperdense foci likely representing chronic

ischemic change.

 

Negative for acute intracranial bleed, mass effect, or infarct

## 2018-11-21 NOTE — GENERAL SURGERY PROGRESS NOTE
General Surgery-Progress Note


Subjective


Additional Comments


still unresponsive.  NG tube in place.  imaging and labs noted





Objective





Last 24 Hour Vital Signs








  Date Time  Temp Pulse Resp B/P (MAP) Pulse Ox O2 Delivery O2 Flow Rate FiO2


 


11/21/18 08:24 98.6 82 18 98/62 (74) 95   


 


11/21/18 04:00 98.8 78 20 99/61 (74) 99   


 


11/21/18 00:14 95.9       


 


11/20/18 21:00      Room Air  


 


11/20/18 20:00 97.7 65 16 120/75 (90) 94   


 


11/20/18 20:00  65      


 


11/20/18 16:00 95.4 77 18 109/78 (88) 96   


 


11/20/18 16:00  64      


 


11/20/18 12:00 92.4 58 21 95/64 (74) 95   


 


11/20/18 12:00  55      








I&O











Intake and Output  


 


 11/20/18 11/21/18





 19:00 07:00


 


Intake Total 860 ml 100 ml


 


Output Total  800 ml


 


Balance 860 ml -700 ml


 


  


 


Intake Oral 360 ml 


 


Free Water 50 ml 


 


IV Total 450 ml 100 ml


 


Output Urine Total  800 ml


 


# Bowel Movements 1 








Dressing:  saturated


Wound:  other


Drains:  other


Cardiovascular:  RSR


Respiratory:  clear


Abdomen:  soft, flat, present bowel sounds


Extremities:  other





Plan


Problems:  


(1) Decubitus ulcer of sacral region, stage 4


Assessment & Plan:  77 year old male presented with Resolving stage 4 full 

thickness sacral decubitus ulcer.  Seems to have had prior debridement and 

care.  Seems to be healing well over time.  Chronic.  no odor.  no significant 

drainage.  no signs of infection.  wound bed clean with granulation tissue.  

3x4.  karen-wound clean.  





Plan:


Wash sacral decubitus ulcer daily with NS, apply hydrogel, pack with gauze, 

apply foam dressing daily and prn





turn q2h





air mattress





heel protectors 








(2) Dysphagia


Assessment & Plan:  would recommend feeding tube if continued care


wounds will not heal without proper nutrition


otherwise ng tube for now and hospice care 


thank you 














Adalberto Cox Nov 21, 2018 09:43

## 2018-11-21 NOTE — GENERAL PROGRESS NOTE
Assessment/Plan


Problem List:  


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


(2) Anxiety


ICD Codes:  F41.9 - Anxiety disorder, unspecified


SNOMED:  76729688


Assessment/Plan


ativan prn


seroquel prn


raise the head to prevent aspiration


the pt was administer Ativan and the pt became hypotensive





Subjective


Neurologic/Psychiatric:  Reports: anxiety, depressed


Allergies:  


Coded Allergies:  


     No Known Allergies (Unverified , 11/16/18)


Subjective


The pt has waxing and waning of consciousness. he has episodes of agitation his 

mental condition is unchanged





Objective





Last 24 Hour Vital Signs








  Date Time  Temp Pulse Resp B/P (MAP) Pulse Ox O2 Delivery O2 Flow Rate FiO2


 


11/21/18 15:48 97.0 62 16 104/63 (77) 100   


 


11/21/18 12:07 97.1 68 16 89/54 (66) 97   


 


11/21/18 09:00      Room Air  


 


11/21/18 08:24 98.6 82 18 98/62 (74) 95   


 


11/21/18 04:00 98.8 78 20 99/61 (74) 99   


 


11/21/18 00:14 95.9       

















Intake and Output  


 


 11/20/18 11/21/18





 19:00 07:00


 


Intake Total 860 ml 200 ml


 


Output Total  800 ml


 


Balance 860 ml -600 ml


 


  


 


Intake Oral 360 ml 


 


Free Water 50 ml 


 


IV Total 450 ml 200 ml


 


Output Urine Total  800 ml


 


# Bowel Movements 1 








Laboratory Tests


11/21/18 11:10: 


White Blood Count 4.7L, Red Blood Count 3.93L, Hemoglobin 10.9L, Hematocrit 

32.4L, Mean Corpuscular Volume 82, Mean Corpuscular Hemoglobin 27.7, Mean 

Corpuscular Hemoglobin Concent 33.5, Red Cell Distribution Width 12.3, Platelet 

Count 49L, Mean Platelet Volume 11.5H, Neutrophils (%) (Auto) , Lymphocytes (%) 

(Auto) , Monocytes (%) (Auto) , Eosinophils (%) (Auto) , Basophils (%) (Auto) , 

Differential Total Cells Counted 100, Neutrophils % (Manual) 71, Lymphocytes % (

Manual) 7L, Monocytes % (Manual) 8, Eosinophils % (Manual) 0, Basophils % (

Manual) 0, Band Neutrophils 14H, Platelet Estimate DecreasedL, Platelet 

Morphology , Giant Platelets Rare, Hypochromasia 1+, Sodium Level 143, 

Potassium Level 3.0L, Chloride Level 109H, Carbon Dioxide Level 28, Anion Gap 6

, Blood Urea Nitrogen 34H, Creatinine 1.4H, Estimat Glomerular Filtration Rate 

, Glucose Level 75, Calcium Level 8.7, Phosphorus Level 2.6, Magnesium Level 2.0

, Total Bilirubin 0.6, Aspartate Amino Transf (AST/SGOT) 67H, Alanine 

Aminotransferase (ALT/SGPT) 82H, Alkaline Phosphatase 71, Total Protein 5.7L, 

Albumin 2.3L, Globulin 3.4, Albumin/Globulin Ratio 0.7L


Height (Feet):  5


Height (Inches):  7.00


Weight (Pounds):  159


General Appearance:  alert, confused, agitated











Alex Love MD Nov 21, 2018 22:31

## 2018-11-22 NOTE — DISCHARGE SUMMARY
Discharge Summary


Discharge Summary


_


DATE OF ADMISSION: 11/16/2018


DATE OF DISCHARGE: 11/21/2018





CONSULTANTS: 


Dr. Alex Álvarez   





[facility] HOSPITAL COURSE:


Patient is a 77-year-old male, who presented to emergency room due to altered 

mental status.  Patient unable to talk, had dysphagia.  He came from home.





On evaluation at ED, blood work did not show any leukocytoses, hemoglobin and 

hematocrit were stable, platelet was low.  Sodium was elevated to 153, chloride 

114, BUN 44 and creatinine 1.4.  Troponin was negative.  Lactic acid was 

elevated to 3.9.  He had an EKG that showed normal sinus rhythm.  Head CT 

showed chronic age-related changes negative acute intracranial bleed or mass 

effect.  There was an old lacunar infarct on the left.  Chest x-ray showed no 

acute process.  He was admitted for evaluation of encephalopathy, UTI, 

dehydration, and hypernatremia.





He was placed on nothing by mouth.  Swallow evaluation was done and recommended 

strict nothing by mouth.  NG tube inserted.  He was given D5W. Renal function 

was monitored.  He had episodes of low blood pressure and was given albumin 

infusion.





Hematologist was consulted for evaluation of thrombocytopenia.  HIV and 

hepatitis panel were negative.  Abdominal ultrasound showed unremarkable spleen 

and liver.  Negative for gallstone.  He had thrombocytopenia which is 

multifactorial.  Anemia workup was done.  Ferritin was 594.  Patient was 

assessed to have anemia of chronic disease.





Cardiologist was consulted.  Hypotension possibly due to severe intravascular 

volume depletion as well as kidney injury.  Echocardiogram done showed ejection 

fraction 55%, moderate mitral regurgitation, moderate tricuspid regurgitation 

and pulmonary hypertension.





Neuro evaluation was done.  Patient was aphasic and unable to communicate.  

Unclear of baseline mental status.  Patient was assessed to have chronic 

debility with superadded toxic metabolic encephalopathy related to significant 

and severe dehydration and possible acute acute infectious process.





He had episodes of agitation.  Psychiatrist was consulted.  He was given Ativan 

and Seroquel.





He was noted to to have sacral stage IV ulcer on admission.  Surgery was called 

to evaluate and assist with management.  Patient seemed to have prior 

debridement done and seemed to be healing well.  There was no sign of 

infection.   Wound bed was clean with granulation tissue.  He was placed on 

wound care and was placed on air mattress.  Recommended frequent turning and 

offloading.





Brain MRI done showed negative acute intracranial bleed, mass effect or 

infarct.  There were findings of chronic ischemic changes.  Urine culture did 

not isolate any growth.  Blood culture did not isolate any growth.  Patient's 

family requested DO NOT RESUSCITATE/DO NOT INTUBATE and hospice care.  





Patient was then transferred to [facility] with hospice.





FINAL DIAGNOSES: 


Altered mental status/encephalopathy


Parkinson's disease


Dysphagia


Hypotension


Acute kidney injury


Thrombocytopenia


Anemia of chronic disease


Hypernatremia


Anxiety


Decubitus ulcer of the sacral region stage IV, present on admission





DISPOSITION: Patient was discharged to SNF with hospice.





DISCHARGE MEDICATIONS: Refer to Discharge Medication List.





I have been assigned to dictate discharge summary on this account, and I was 

not involved in the patient's management.











Jossie Tolentino NP Nov 22, 2018 11:23

## 2018-11-22 NOTE — DIAGNOSTIC IMAGING REPORT
--------------- APPROVED REPORT --------------





CPT Code: 61192



Present Symptoms

Comments: BILATERAL LEGS PAIN.





BILATERAL: Imaging reveals a patent deep venous system bilaterally. There is no evidence 

of thrombus within the femoral, popliteal or tibial segments. The greater saphenous veins 

are also within normal limits. Doppler indicates normal spontaneous flow within these 

segments.

## 2021-06-17 NOTE — PROGRESS NOTE
DATE:  11/20/2018

SUBJECTIVE:  This is an elderly male, who came in with altered mental

status _____, the patient has hypothermia this morning, but is

asymptomatic.  He opens his eyes.  No distress.



PHYSICAL EXAMINATION:

VITAL SIGNS:  Blood pressure is 95/64, pulse 58, respirations 21,

temperature 92.4.

HEENT:  Eyes are open.

NECK:  Supple.

CHEST:  Bilaterally clear.

CARDIOVASCULAR:  Regular rhythm.

ABDOMEN:  Soft.

EXTREMITIES:  CCE.

NEUROLOGICAL:  Generalized weakness, bedbound, and has sacral decubiti.  He

is tolerating tube feeding.



ASSESSMENT:

1. Altered mental status.

2. Parkinson disease.

3. Decubitus.

4. Generalized weakness.



PLAN:  We will currently continue NG tube.  The patient is a DNR/DNI.  I

left the message to the son.  MRI of the brain, probably _____ long-term

is not going to make any difference in the treatment plan.  The patient is

probably waiting for placement as well as a hospice consult and they were

already informed.  Discussed with charge nurse.  He has hypothermia, the

patient is asymptomatic and discussed with charge nurse.









  ______________________________________________

  Todd Spear M.D.





DR:  Sae

D:  11/20/2018 16:04

T:  11/20/2018 18:35

JOB#:  [id number]

CC: patient